# Patient Record
Sex: MALE | Race: WHITE | Employment: OTHER | URBAN - METROPOLITAN AREA
[De-identification: names, ages, dates, MRNs, and addresses within clinical notes are randomized per-mention and may not be internally consistent; named-entity substitution may affect disease eponyms.]

---

## 2017-12-15 ENCOUNTER — OFFICE VISIT (OUTPATIENT)
Dept: URGENT CARE | Facility: URGENT CARE | Age: 32
End: 2017-12-15
Payer: MEDICAID

## 2017-12-15 VITALS
OXYGEN SATURATION: 100 % | DIASTOLIC BLOOD PRESSURE: 94 MMHG | HEART RATE: 96 BPM | WEIGHT: 212.6 LBS | SYSTOLIC BLOOD PRESSURE: 160 MMHG | TEMPERATURE: 98.7 F | BODY MASS INDEX: 29.44 KG/M2

## 2017-12-15 DIAGNOSIS — R51.9 NONINTRACTABLE HEADACHE, UNSPECIFIED CHRONICITY PATTERN, UNSPECIFIED HEADACHE TYPE: ICD-10-CM

## 2017-12-15 DIAGNOSIS — H66.003 ACUTE SUPPURATIVE OTITIS MEDIA OF BOTH EARS WITHOUT SPONTANEOUS RUPTURE OF TYMPANIC MEMBRANES, RECURRENCE NOT SPECIFIED: Primary | ICD-10-CM

## 2017-12-15 DIAGNOSIS — Z72.0 TOBACCO ABUSE: ICD-10-CM

## 2017-12-15 DIAGNOSIS — R03.0 ELEVATED BLOOD PRESSURE READING WITHOUT DIAGNOSIS OF HYPERTENSION: ICD-10-CM

## 2017-12-15 PROCEDURE — 99214 OFFICE O/P EST MOD 30 MIN: CPT | Performed by: FAMILY MEDICINE

## 2017-12-15 NOTE — MR AVS SNAPSHOT
After Visit Summary   12/15/2017    Neftali Chan    MRN: [id number]           Patient Information     Date Of Birth          1985        Visit Information        Provider Department      12/15/2017 6:20 PM Gama Miranda MD [facility]        Today's Diagnoses     Acute suppurative otitis media of both ears without spontaneous rupture of tympanic membranes, recurrence not specified    -  1    Nonintractable headache, unspecified chronicity pattern, unspecified headache type        Tobacco abuse          Care Instructions      Otitis Media (Middle-Ear Infection) in Adults  Otitis media is another name for a middle-ear infection. It means an infection behind your eardrum. This kind of ear infection can happen after any condition that keeps fluid from draining from the middle ear. These conditions include allergies, a cold, a sore throat, or a respiratory infection.  Middle-ear infections are common in children, but they can also happen in adults. An ear infection in an adult may mean a more serious problem than in a child. So you may need additional tests. If you have an ear infection, you should see your health care provider for treatment.  What are the types of middle-ear infections?  Infections can affect the middle ear in several ways. They are:    Acute otitis media. This middle-ear infection occurs suddenly. It causes swelling and redness. Fluid and mucus become trapped inside the ear. You can have a fever and ear pain.    Otitis media with effusion. Fluid (effusion) and mucus build up in the middle ear after the infection goes away. You may feel like your middle ear is full. This can continue for months and may affect your hearing.    Chronic otitis media with effusion. Fluid (effusion) remains in the middle ear for a long time. Or it builds up again and again, even though there is no infection. This type of middle-ear infection may be hard to treat. It may also affect  your hearing.  Who is more likely to get a middle-ear infection?  You are more likely to get an ear infection if you:    Smoke or are around someone who smokes    Have seasonal or year-round allergy symptoms    Have a cold or other upper respiratory infection  What causes a middle-ear infection?  The middle ear connects to the throat by a canal called the eustachian tube. This tube helps even out the pressure between the outer ear and the inner ear. A cold or allergy can irritate the tube or cause the area around it to swell. This can keep fluid from draining from the middle ear. The fluid builds up behind the eardrum. Bacteria and viruses can grow in this fluid. The bacteria and viruses cause the middle-ear infection.  What are the symptoms of a middle-ear infection?  Common symptoms of a middle-ear infection in adults are:    Pain in 1 or both ears    Drainage from the ear    Muffled hearing    Sore throat   You may also have a fever. Rarely, your balance can be affected.  These symptoms may be the same as for other conditions. It s important to talk with your health care provider if you think you have a middle-ear infection. If you have a high fever, severe pain behind your ear, or paralysis in your face, see your provider as soon as you can.  How is a middle-ear infection diagnosed?  Your health care provider will take a medical history and do a physical exam. He or she will look at the outer ear and eardrum with an otoscope. The otoscope is a lighted tool that lets your provider see inside the ear. A pneumatic otoscope blows a puff of air into the ear to check how well your eardrum moves. If you eardrum doesn t move well, it may mean you have fluid behind it.  Your provider may also do a test called tympanometry. This test tells how well the middle ear is working. It can find any changes in pressure in the middle ear. Your provider may test your hearing with a tuning fork.  How is a middle-ear infection  treated?  A middle-ear infection may be treated with:    Antibiotics, taken by mouth or as ear drops    Medication for pain    Decongestants, antihistamines, or nasal steroids  Your health care provider may also have you try autoinsufflation. This helps adjust the air pressure in your ear. For this, you pinch your nose and gently exhale. This forces air back through the eustachian tube.  The exact treatment for your ear infection will depend on the type of infection you have. In general, if your symptoms don t get better in 48 to 72 hours, contact your health care provider.  Middle-ear infections can cause long-term problems if not treated. They can lead to:    Infection in other parts of the head    Permanent hearing loss    Paralysis of a nerve in your face  If you have a middle-ear infection that doesn t get better, you may need to see an ear, nose, and throat specialist (otolaryngologist). You may need a CT scan or MRI to check for head and neck cancer.  Ear tubes  Sometimes fluid stays in the middle ear even after you take antibiotics and the infection goes away. In this case, your health care provider may suggest that a small tube be placed in your ear. The tube is put at the opening of the eardrum. The tube keeps fluid from building up and relieves pressure in the middle ear. It can also help you hear better. This surgery is called myringotomy. It is not often done in adults.  The tubes usually fall out on their own after 6 months to a year.    6529-8750 [employer] [address] [place] [address]. All rights reserved. This information is not intended as a substitute for professional medical care. Always follow your healthcare professional's instructions.        Amoxicillin; Clavulanic Acid tablets  Brand Name: Augmentin  What is this medicine?  AMOXICILLIN; CLAVULANIC ACID (a mox i ABDIRIZAK in; SOLO foy ic AS id) is a penicillin antibiotic. It is used to treat certain kinds of  bacterial infections. It will not work for colds, flu, or other viral infections.  How should I use this medicine?  Take this medicine by mouth with a full glass of water. Follow the directions on the prescription label. Take at the start of a meal. Do not crush or chew. If the tablet has a score line, you may cut it in half at the score line for easier swallowing. Take your medicine at regular intervals. Do not take your medicine more often than directed. Take all of your medicine as directed even if you think you are better. Do not skip doses or stop your medicine early.  Talk to your pediatrician regarding the use of this medicine in children. Special care may be needed.  What side effects may I notice from receiving this medicine?  Side effects that you should report to your doctor or health care professional as soon as possible:    allergic reactions like skin rash, itching or hives, swelling of the face, lips, or tongue    breathing problems    dark urine    fever or chills, sore throat    redness, blistering, peeling or loosening of the skin, including inside the mouth    seizures    trouble passing urine or change in the amount of urine    unusual bleeding, bruising    unusually weak or tired    white patches or sores in the mouth or throat  Side effects that usually do not require medical attention (report to your doctor or health care professional if they continue or are bothersome):    diarrhea    dizziness    headache    nausea, vomiting    stomach upset    vaginal or anal irritation  What may interact with this medicine?    allopurinol    anticoagulants    birth control pills    methotrexate    probenecid  What if I miss a dose?  If you miss a dose, take it as soon as you can. If it is almost time for your next dose, take only that dose. Do not take double or extra doses.  Where should I keep my medicine?  Keep out of the reach of children.  Store at room temperature below 25 degrees C (77 degrees F).  Keep container tightly closed. Throw away any unused medicine after the expiration date.  What should I tell my health care provider before I take this medicine?  They need to know if you have any of these conditions:    bowel disease, like colitis    kidney disease    liver disease    mononucleosis    an unusual or allergic reaction to amoxicillin, penicillin, cephalosporin, other antibiotics, clavulanic acid, other medicines, foods, dyes, or preservatives    pregnant or trying to get pregnant    breast-feeding  What should I watch for while using this medicine?  Tell your doctor or health care professional if your symptoms do not improve.  Do not treat diarrhea with over the counter products. Contact your doctor if you have diarrhea that lasts more than 2 days or if it is severe and watery.  If you have diabetes, you may get a false-positive result for sugar in your urine. Check with your doctor or health care professional.  Birth control pills may not work properly while you are taking this medicine. Talk to your doctor about using an extra method of birth control.  NOTE:This sheet is a summary. It may not cover all possible information. If you have questions about this medicine, talk to your doctor, pharmacist, or health care provider. Copyright  2017 [employer]        Self-Care for Headaches  Most headaches aren't serious and can be relieved with self-care. But some headaches may be a sign of another health problem like eye trouble or high blood pressure. To find the best treatment, learn what kind of headaches you get. For tension headaches, self-care will usually help. To treat migraines, ask your healthcare provider for advice. It is also possible to get both tension and migraine headaches. Self-care involves relieving the pain and avoiding headache  triggers  if you can.    Ways to reduce pain and tension  Try these steps:    Apply a cold compress or ice pack to the pain site.    Drink fluids. If nausea makes  "it hard to drink, try sucking on ice.    Rest. Protect yourself from bright light and loud noises.    Calm your emotions by imagining a peaceful scene.    Massage tight neck, shoulder, and head muscles.    To relax muscles, soak in a hot bath or use a hot shower.  Use medicines  Aspirin or aspirin substitutes, such as ibuprofen and acetaminophen, can relieve headache. Remember: Never give aspirin to anyone 18 years old or younger because of the risk of developing Reye syndrome. Use pain medicines only when necessary.  Track your headaches  Keeping a headache diary can help you and your healthcare provider identify what's causing your headaches:    Note when each headache happens.    Identify your activities and the foods you've eaten 6 to 8 hours before the headache began.    Look for any trends or \"triggers.\"  Signs of tension headache  Any of the following can be signs:    Dull pain or feeling of pressure in a tight band around your head    Pain in your neck or shoulders    Headache without a definite beginning or end    Headache after an activity such as driving or working on a computer  Signs of migraine  Any of the following can be signs:    Throbbing pain on one or both sides of your head    Nausea or vomiting    Extreme sensitivity to light, sound, and smells    Bright spots, flashes, or other visual changes    Pain or nausea so severe that you can't continue your daily activities  Call your healthcare provider   If you have any of the following symptoms, contact your healthcare provider:    A headache that lingers after a recent injury or bump to the head.    A fever with a stiff neck or pain when you bend your head toward your chest.    A headache along with slurred speech, changes in your vision, or numbness or weakness in your arms or legs.    A headache for longer than 3 days.    Frequent headaches, especially in the morning.    Headaches with seizures     Seek immediate medical attention if you have a " "headache that you would call \"the worst headache you have ever had.\"   Date Last Reviewed: 10/4/2015    4700-5815 [employer] [facility] [employer]. [address] [place] [address], [place] [address]. All rights reserved. This information is not intended as a substitute for professional medical care. Always follow your healthcare professional's instructions.                Follow-ups after your visit        Who to contact     If you have questions or need follow up information about today's clinic visit or your schedule please contact [facility] [place] directly at [phone].  Normal or non-critical lab and imaging results will be communicated to you by CloudEndurehart, letter or phone within 4 business days after the clinic has received the results. If you do not hear from us within 7 days, please contact the clinic through CloudEndurehart or phone. If you have a critical or abnormal lab result, we will notify you by phone as soon as possible.  Submit refill requests through [employer] or call your pharmacy and they will forward the refill request to us. Please allow 3 business days for your refill to be completed.          Additional Information About Your Visit        MyChart Information     [employer] lets you send messages to your doctor, view your test results, renew your prescriptions, schedule appointments and more. To sign up, go to [email or web address] . Click on \"Log in\" on the left side of the screen, which will take you to the Welcome page. Then click on \"Sign up Now\" on the right side of the page.     You will be asked to enter the access code listed below, as well as some personal information. Please follow the directions to create your username and password.     Your access code is: [email or web address]  Expires: 3/15/2018  6:43 PM     Your access code will  in 90 days. If you need help or a new code, please call your [facility] or [phone].        Care EveryWhere ID     This is your Care EveryWhere ID. This could be " used by other organizations to access your [facility] medical records  [id number]        Your Vitals Were     Pulse Temperature Pulse Oximetry BMI (Body Mass Index)          96 98.7  F (37.1  C) (Tympanic) 100% 29.44 kg/m2         Blood Pressure from Last 3 Encounters:   12/15/17 (!) 160/94   11/20/15 136/74   11/17/15 140/81    Weight from Last 3 Encounters:   12/15/17 212 lb 9.6 oz (96.4 kg)   11/20/15 204 lb (92.5 kg)   11/17/15 202 lb 6.4 oz (91.8 kg)              Today, you had the following     No orders found for display         Today's Medication Changes          These changes are accurate as of: 12/15/17  6:44 PM.  If you have any questions, ask your nurse or doctor.               Start taking these medicines.        Dose/Directions    amoxicillin-clavulanate 875-125 MG per tablet   Commonly known as:  AUGMENTIN   Used for:  Acute suppurative otitis media of both ears without spontaneous rupture of tympanic membranes, recurrence not specified        Dose:  1 tablet   Take 1 tablet by mouth 2 times daily   Quantity:  20 tablet   Refills:  0            Where to get your medicines      These medications were sent to [facility] [address] [place] [address] [address] [facility] [address] [facility] [place]  [address] [facility] [address]     Phone:  [phone]     amoxicillin-clavulanate 875-125 MG per tablet                Primary Care Provider Office Phone # Fax #    [facility] [phone] [address] [facility] [address]        Equal Access to Services     JESUS MENCHACA AH: Hadii aad ku hadasho Soharriet, waaxda luqadaha, qaybta kaalmada adecaleb, tanisha etienne. So [facility] [phone].    ATENCIÓN: Si habla español, tiene a dwyer disposición servicios gratuitos de asistencia lingüística. Llame al [phone].    We comply with applicable federal civil rights laws and [place] laws. We do not discriminate on the basis of  race, color, national origin, age, disability, sex, sexual orientation, or gender identity.            Thank you!     Thank you for choosing [facility]  [facility]. Our goal is always to provide you with excellent care. Hearing back from our patients is one way we can continue to improve our services. Please take a few minutes to complete the written survey that you may receive in the mail after your visit with us. Thank you!             Your Updated Medication List - Protect others around you: Learn how to safely use, store and throw away your medicines at www.disposemymeds.org.          This list is accurate as of: 12/15/17  6:44 PM.  Always use your most recent med list.                   Brand Name Dispense Instructions for use Diagnosis    acetaminophen-codeine 300-30 MG per tablet    TYLENOL w/CODEINE No. 3    20 tablet    Take 1-2 tablets by mouth nightly as needed for mild pain    Suppurative otitis media of left ear, unspecified chronicity       amoxicillin-clavulanate 875-125 MG per tablet    AUGMENTIN    20 tablet    Take 1 tablet by mouth 2 times daily    Acute suppurative otitis media of both ears without spontaneous rupture of tympanic membranes, recurrence not specified       cefuroxime 500 MG tablet    CEFTIN    20 tablet    Take 1 tablet (500 mg) by mouth 2 times daily    Suppurative otitis media of left ear, unspecified chronicity       cyclobenzaprine 10 MG tablet    FLEXERIL    14 tablet    Take 1 tablet (10 mg) by mouth nightly as needed for muscle spasms    Cervicalgia       guaiFENesin-codeine 100-10 MG/5ML Soln solution    ROBITUSSIN AC    120 mL    Take 5-10 mLs by mouth At Bedtime    Upper respiratory tract infection, unspecified upper respiratory infection

## 2017-12-16 NOTE — PATIENT INSTRUCTIONS
Otitis Media (Middle-Ear Infection) in Adults  Otitis media is another name for a middle-ear infection. It means an infection behind your eardrum. This kind of ear infection can happen after any condition that keeps fluid from draining from the middle ear. These conditions include allergies, a cold, a sore throat, or a respiratory infection.  Middle-ear infections are common in children, but they can also happen in adults. An ear infection in an adult may mean a more serious problem than in a child. So you may need additional tests. If you have an ear infection, you should see your health care provider for treatment.  What are the types of middle-ear infections?  Infections can affect the middle ear in several ways. They are:    Acute otitis media. This middle-ear infection occurs suddenly. It causes swelling and redness. Fluid and mucus become trapped inside the ear. You can have a fever and ear pain.    Otitis media with effusion. Fluid (effusion) and mucus build up in the middle ear after the infection goes away. You may feel like your middle ear is full. This can continue for months and may affect your hearing.    Chronic otitis media with effusion. Fluid (effusion) remains in the middle ear for a long time. Or it builds up again and again, even though there is no infection. This type of middle-ear infection may be hard to treat. It may also affect your hearing.  Who is more likely to get a middle-ear infection?  You are more likely to get an ear infection if you:    Smoke or are around someone who smokes    Have seasonal or year-round allergy symptoms    Have a cold or other upper respiratory infection  What causes a middle-ear infection?  The middle ear connects to the throat by a canal called the eustachian tube. This tube helps even out the pressure between the outer ear and the inner ear. A cold or allergy can irritate the tube or cause the area around it to swell. This can keep fluid from draining from  the middle ear. The fluid builds up behind the eardrum. Bacteria and viruses can grow in this fluid. The bacteria and viruses cause the middle-ear infection.  What are the symptoms of a middle-ear infection?  Common symptoms of a middle-ear infection in adults are:    Pain in 1 or both ears    Drainage from the ear    Muffled hearing    Sore throat   You may also have a fever. Rarely, your balance can be affected.  These symptoms may be the same as for other conditions. It s important to talk with your health care provider if you think you have a middle-ear infection. If you have a high fever, severe pain behind your ear, or paralysis in your face, see your provider as soon as you can.  How is a middle-ear infection diagnosed?  Your health care provider will take a medical history and do a physical exam. He or she will look at the outer ear and eardrum with an otoscope. The otoscope is a lighted tool that lets your provider see inside the ear. A pneumatic otoscope blows a puff of air into the ear to check how well your eardrum moves. If you eardrum doesn t move well, it may mean you have fluid behind it.  Your provider may also do a test called tympanometry. This test tells how well the middle ear is working. It can find any changes in pressure in the middle ear. Your provider may test your hearing with a tuning fork.  How is a middle-ear infection treated?  A middle-ear infection may be treated with:    Antibiotics, taken by mouth or as ear drops    Medication for pain    Decongestants, antihistamines, or nasal steroids  Your health care provider may also have you try autoinsufflation. This helps adjust the air pressure in your ear. For this, you pinch your nose and gently exhale. This forces air back through the eustachian tube.  The exact treatment for your ear infection will depend on the type of infection you have. In general, if your symptoms don t get better in 48 to 72 hours, contact your health care  provider.  Middle-ear infections can cause long-term problems if not treated. They can lead to:    Infection in other parts of the head    Permanent hearing loss    Paralysis of a nerve in your face  If you have a middle-ear infection that doesn t get better, you may need to see an ear, nose, and throat specialist (otolaryngologist). You may need a CT scan or MRI to check for head and neck cancer.  Ear tubes  Sometimes fluid stays in the middle ear even after you take antibiotics and the infection goes away. In this case, your health care provider may suggest that a small tube be placed in your ear. The tube is put at the opening of the eardrum. The tube keeps fluid from building up and relieves pressure in the middle ear. It can also help you hear better. This surgery is called myringotomy. It is not often done in adults.  The tubes usually fall out on their own after 6 months to a year.    2407-0166 [employer]. [address] [place] [address]. All rights reserved. This information is not intended as a substitute for professional medical care. Always follow your healthcare professional's instructions.        Amoxicillin; Clavulanic Acid tablets  Brand Name: Augmentin  What is this medicine?  AMOXICILLIN; CLAVULANIC ACID (a mox i ABDIRIZAK in; SOLO foy ic AS id) is a penicillin antibiotic. It is used to treat certain kinds of bacterial infections. It will not work for colds, flu, or other viral infections.  How should I use this medicine?  Take this medicine by mouth with a full glass of water. Follow the directions on the prescription label. Take at the start of a meal. Do not crush or chew. If the tablet has a score line, you may cut it in half at the score line for easier swallowing. Take your medicine at regular intervals. Do not take your medicine more often than directed. Take all of your medicine as directed even if you think you are better. Do not skip doses or stop your medicine  early.  Talk to your pediatrician regarding the use of this medicine in children. Special care may be needed.  What side effects may I notice from receiving this medicine?  Side effects that you should report to your doctor or health care professional as soon as possible:    allergic reactions like skin rash, itching or hives, swelling of the face, lips, or tongue    breathing problems    dark urine    fever or chills, sore throat    redness, blistering, peeling or loosening of the skin, including inside the mouth    seizures    trouble passing urine or change in the amount of urine    unusual bleeding, bruising    unusually weak or tired    white patches or sores in the mouth or throat  Side effects that usually do not require medical attention (report to your doctor or health care professional if they continue or are bothersome):    diarrhea    dizziness    headache    nausea, vomiting    stomach upset    vaginal or anal irritation  What may interact with this medicine?    allopurinol    anticoagulants    birth control pills    methotrexate    probenecid  What if I miss a dose?  If you miss a dose, take it as soon as you can. If it is almost time for your next dose, take only that dose. Do not take double or extra doses.  Where should I keep my medicine?  Keep out of the reach of children.  Store at room temperature below 25 degrees C (77 degrees F). Keep container tightly closed. Throw away any unused medicine after the expiration date.  What should I tell my health care provider before I take this medicine?  They need to know if you have any of these conditions:    bowel disease, like colitis    kidney disease    liver disease    mononucleosis    an unusual or allergic reaction to amoxicillin, penicillin, cephalosporin, other antibiotics, clavulanic acid, other medicines, foods, dyes, or preservatives    pregnant or trying to get pregnant    breast-feeding  What should I watch for while using this  medicine?  Tell your doctor or health care professional if your symptoms do not improve.  Do not treat diarrhea with over the counter products. Contact your doctor if you have diarrhea that lasts more than 2 days or if it is severe and watery.  If you have diabetes, you may get a false-positive result for sugar in your urine. Check with your doctor or health care professional.  Birth control pills may not work properly while you are taking this medicine. Talk to your doctor about using an extra method of birth control.  NOTE:This sheet is a summary. It may not cover all possible information. If you have questions about this medicine, talk to your doctor, pharmacist, or health care provider. Copyright  2017 [employer]        Self-Care for Headaches  Most headaches aren't serious and can be relieved with self-care. But some headaches may be a sign of another health problem like eye trouble or high blood pressure. To find the best treatment, learn what kind of headaches you get. For tension headaches, self-care will usually help. To treat migraines, ask your healthcare provider for advice. It is also possible to get both tension and migraine headaches. Self-care involves relieving the pain and avoiding headache  triggers  if you can.    Ways to reduce pain and tension  Try these steps:    Apply a cold compress or ice pack to the pain site.    Drink fluids. If nausea makes it hard to drink, try sucking on ice.    Rest. Protect yourself from bright light and loud noises.    Calm your emotions by imagining a peaceful scene.    Massage tight neck, shoulder, and head muscles.    To relax muscles, soak in a hot bath or use a hot shower.  Use medicines  Aspirin or aspirin substitutes, such as ibuprofen and acetaminophen, can relieve headache. Remember: Never give aspirin to anyone 18 years old or younger because of the risk of developing Reye syndrome. Use pain medicines only when necessary.  Track your headaches  Keeping a  "headache diary can help you and your healthcare provider identify what's causing your headaches:    Note when each headache happens.    Identify your activities and the foods you've eaten 6 to 8 hours before the headache began.    Look for any trends or \"triggers.\"  Signs of tension headache  Any of the following can be signs:    Dull pain or feeling of pressure in a tight band around your head    Pain in your neck or shoulders    Headache without a definite beginning or end    Headache after an activity such as driving or working on a computer  Signs of migraine  Any of the following can be signs:    Throbbing pain on one or both sides of your head    Nausea or vomiting    Extreme sensitivity to light, sound, and smells    Bright spots, flashes, or other visual changes    Pain or nausea so severe that you can't continue your daily activities  Call your healthcare provider   If you have any of the following symptoms, contact your healthcare provider:    A headache that lingers after a recent injury or bump to the head.    A fever with a stiff neck or pain when you bend your head toward your chest.    A headache along with slurred speech, changes in your vision, or numbness or weakness in your arms or legs.    A headache for longer than 3 days.    Frequent headaches, especially in the morning.    Headaches with seizures     Seek immediate medical attention if you have a headache that you would call \"the worst headache you have ever had.\"   Date Last Reviewed: 10/4/2015    7843-1659 [employer]. [address] [place] [address]. All rights reserved. This information is not intended as a substitute for professional medical care. Always follow your healthcare professional's instructions.        "

## 2017-12-16 NOTE — PROGRESS NOTES
SUBJECTIVE:                                                    Neftali Chan is a 32 year old male who presents to clinic today for the following health issues:    Headaches      Duration: 3 weeks    Description  Location: all over his head   Character: sharp pain, squeezing pain  Frequency:  All day  Duration:  Every day    Intensity:  Severe, 10/10    Accompanying signs and symptoms:    Precipitating or Alleviating factors:  Nausea/vomiting: no  Dizziness: always  Weakness or numbness: always- arthritis   Visual changes: blurred vision when he had the stabbing pain  Fever: no   Sinus or URI symptoms YES- ear pain, eye pain, and chest pain    History  Head trauma: no   Family history of migraines: no   Previous tests for headaches: no   Neurologist evaluations: no   Able to do daily activities when headache present: no   Wake with headaches: YES  Daily pain medication use: YES  Any changes in: none    Precipitating or Alleviating factors (light/sound/sleep/caffeine): none    Therapies tried and outcome: Ibuprofen (Advil, Motrin) and Tylenol    Outcome - not effective  Frequent/daily pain medication use: no     Patient was seen at [place] ED last night for the same issue.  Patient is not feeling better        Problem list and histories reviewed & adjusted, as indicated.  Additional history: as documented    Patient Active Problem List   Diagnosis     Bipolar 2 disorder (H)     Anxiety     Tobacco abuse     No past surgical history on file.    Social History   Substance Use Topics     Smoking status: Current Every Day Smoker     Packs/day: 0.75     Types: Cigarettes     Smokeless tobacco: Former User     Alcohol use No     Family History   Problem Relation Age of Onset     Asthma Mother      Asthma Father      Asthma Sister      C.A.D. Father      DIABETES No family hx of      Hypertension Father      CEREBROVASCULAR DISEASE Father      Breast Cancer No family hx of      Cancer - colorectal No family hx  of      Prostate Cancer No family hx of          Current Outpatient Prescriptions   Medication Sig Dispense Refill     amoxicillin-clavulanate (AUGMENTIN) 875-125 MG per tablet Take 1 tablet by mouth 2 times daily 20 tablet 0     cefUROXime (CEFTIN) 500 MG tablet Take 1 tablet (500 mg) by mouth 2 times daily 20 tablet 0     acetaminophen-codeine (TYLENOL W/CODEINE NO. 3) 300-30 MG per tablet Take 1-2 tablets by mouth nightly as needed for mild pain 20 tablet 0     guaiFENesin-codeine (ROBITUSSIN AC) 100-10 MG/5ML SOLN Take 5-10 mLs by mouth At Bedtime 120 mL 0     cyclobenzaprine (FLEXERIL) 10 MG tablet Take 1 tablet (10 mg) by mouth nightly as needed for muscle spasms 14 tablet 0     No Known Allergies  No lab results found.   BP Readings from Last 3 Encounters:   12/15/17 (!) 160/94   11/20/15 136/74   11/17/15 140/81    Wt Readings from Last 3 Encounters:   12/15/17 212 lb 9.6 oz (96.4 kg)   11/20/15 204 lb (92.5 kg)   11/17/15 202 lb 6.4 oz (91.8 kg)                  Labs reviewed in EPIC          ROS:  Constitutional, HEENT, cardiovascular, pulmonary, GI, , musculoskeletal, neuro, skin, endocrine and psych systems are negative, except as otherwise noted.      OBJECTIVE:   BP (!) 160/94  Pulse 96  Temp 98.7  F (37.1  C) (Tympanic)  Wt 212 lb 9.6 oz (96.4 kg)  SpO2 100%  BMI 29.44 kg/m2  Body mass index is 29.44 kg/(m^2).  GENERAL: alert and in some distress due to pain  EYES: Eyes grossly normal to inspection, PERRL and conjunctivae and sclerae normal  HENT: normal cephalic/atraumatic, right ear: erythematous, bulging membrane and mucopurulent effusion, left ear: erythematous, bulging membrane and mucopurulent effusion, oral mucous membranes moist and oropharxnx crowded  NECK: no adenopathy, no asymmetry, masses, or scars and thyroid normal to palpation  RESP: lungs clear to auscultation - no rales, rhonchi or wheezes  CV: regular rate and rhythm, normal S1 S2, no S3 or S4, no murmur, click or rub, no  peripheral edema and peripheral pulses strong  ABDOMEN: soft, nontender, no hepatosplenomegaly, no masses and bowel sounds normal  MS: no gross musculoskeletal defects noted, no edema  SKIN: no suspicious lesions or rashes  NEURO: Normal strength and tone, sensory exam grossly normal, mentation intact, speech normal and cranial nerves 2-12 intact  LYMPH: no cervical, supraclavicular, axillary, or inguinal adenopathy      ASSESSMENT/PLAN:         ICD-10-CM    1. Acute suppurative otitis media of both ears without spontaneous rupture of tympanic membranes, recurrence not specified H66.003 amoxicillin-clavulanate (AUGMENTIN) 875-125 MG per tablet   2. Nonintractable headache, unspecified chronicity pattern, unspecified headache type R51    3. Tobacco abuse Z72.0    4. Elevated blood pressure reading without diagnosis of hypertension R03.0        Patient had extensive work up yesterday in ER for chest pain and headache. No formal diagnosis was made. Physical exam today remarkable for severe bilateral ear infection. Blood pressure noted to be elevated, likely related to pain. No focal neurology identified. Augmentin prescribed, common side effects discussed. Recommended well hydration, over-the-counter analgesia and rest.  Smoking cessation counseling provided, not ready to quit currently. Written instructions/information provided. Patient understood and in agreement with the above plan. All questions are answered. Follow-up with PCP next week or go ER if symptoms worsen over the weekend.       Patient Instructions       Otitis Media (Middle-Ear Infection) in Adults  Otitis media is another name for a middle-ear infection. It means an infection behind your eardrum. This kind of ear infection can happen after any condition that keeps fluid from draining from the middle ear. These conditions include allergies, a cold, a sore throat, or a respiratory infection.  Middle-ear infections are common in children, but they can  also happen in adults. An ear infection in an adult may mean a more serious problem than in a child. So you may need additional tests. If you have an ear infection, you should see your health care provider for treatment.  What are the types of middle-ear infections?  Infections can affect the middle ear in several ways. They are:    Acute otitis media. This middle-ear infection occurs suddenly. It causes swelling and redness. Fluid and mucus become trapped inside the ear. You can have a fever and ear pain.    Otitis media with effusion. Fluid (effusion) and mucus build up in the middle ear after the infection goes away. You may feel like your middle ear is full. This can continue for months and may affect your hearing.    Chronic otitis media with effusion. Fluid (effusion) remains in the middle ear for a long time. Or it builds up again and again, even though there is no infection. This type of middle-ear infection may be hard to treat. It may also affect your hearing.  Who is more likely to get a middle-ear infection?  You are more likely to get an ear infection if you:    Smoke or are around someone who smokes    Have seasonal or year-round allergy symptoms    Have a cold or other upper respiratory infection  What causes a middle-ear infection?  The middle ear connects to the throat by a canal called the eustachian tube. This tube helps even out the pressure between the outer ear and the inner ear. A cold or allergy can irritate the tube or cause the area around it to swell. This can keep fluid from draining from the middle ear. The fluid builds up behind the eardrum. Bacteria and viruses can grow in this fluid. The bacteria and viruses cause the middle-ear infection.  What are the symptoms of a middle-ear infection?  Common symptoms of a middle-ear infection in adults are:    Pain in 1 or both ears    Drainage from the ear    Muffled hearing    Sore throat   You may also have a fever. Rarely, your balance can be  affected.  These symptoms may be the same as for other conditions. It s important to talk with your health care provider if you think you have a middle-ear infection. If you have a high fever, severe pain behind your ear, or paralysis in your face, see your provider as soon as you can.  How is a middle-ear infection diagnosed?  Your health care provider will take a medical history and do a physical exam. He or she will look at the outer ear and eardrum with an otoscope. The otoscope is a lighted tool that lets your provider see inside the ear. A pneumatic otoscope blows a puff of air into the ear to check how well your eardrum moves. If you eardrum doesn t move well, it may mean you have fluid behind it.  Your provider may also do a test called tympanometry. This test tells how well the middle ear is working. It can find any changes in pressure in the middle ear. Your provider may test your hearing with a tuning fork.  How is a middle-ear infection treated?  A middle-ear infection may be treated with:    Antibiotics, taken by mouth or as ear drops    Medication for pain    Decongestants, antihistamines, or nasal steroids  Your health care provider may also have you try autoinsufflation. This helps adjust the air pressure in your ear. For this, you pinch your nose and gently exhale. This forces air back through the eustachian tube.  The exact treatment for your ear infection will depend on the type of infection you have. In general, if your symptoms don t get better in 48 to 72 hours, contact your health care provider.  Middle-ear infections can cause long-term problems if not treated. They can lead to:    Infection in other parts of the head    Permanent hearing loss    Paralysis of a nerve in your face  If you have a middle-ear infection that doesn t get better, you may need to see an ear, nose, and throat specialist (otolaryngologist). You may need a CT scan or MRI to check for head and neck cancer.  Ear  tubes  Sometimes fluid stays in the middle ear even after you take antibiotics and the infection goes away. In this case, your health care provider may suggest that a small tube be placed in your ear. The tube is put at the opening of the eardrum. The tube keeps fluid from building up and relieves pressure in the middle ear. It can also help you hear better. This surgery is called myringotomy. It is not often done in adults.  The tubes usually fall out on their own after 6 months to a year.    6979-6954 [employer]. [address] [place] [address]. All rights reserved. This information is not intended as a substitute for professional medical care. Always follow your healthcare professional's instructions.        Amoxicillin; Clavulanic Acid tablets  Brand Name: Augmentin  What is this medicine?  AMOXICILLIN; CLAVULANIC ACID (a mox i ABDIRIZAK in; SOLO foy ic AS id) is a penicillin antibiotic. It is used to treat certain kinds of bacterial infections. It will not work for colds, flu, or other viral infections.  How should I use this medicine?  Take this medicine by mouth with a full glass of water. Follow the directions on the prescription label. Take at the start of a meal. Do not crush or chew. If the tablet has a score line, you may cut it in half at the score line for easier swallowing. Take your medicine at regular intervals. Do not take your medicine more often than directed. Take all of your medicine as directed even if you think you are better. Do not skip doses or stop your medicine early.  Talk to your pediatrician regarding the use of this medicine in children. Special care may be needed.  What side effects may I notice from receiving this medicine?  Side effects that you should report to your doctor or health care professional as soon as possible:    allergic reactions like skin rash, itching or hives, swelling of the face, lips, or tongue    breathing problems    dark urine    fever  or chills, sore throat    redness, blistering, peeling or loosening of the skin, including inside the mouth    seizures    trouble passing urine or change in the amount of urine    unusual bleeding, bruising    unusually weak or tired    white patches or sores in the mouth or throat  Side effects that usually do not require medical attention (report to your doctor or health care professional if they continue or are bothersome):    diarrhea    dizziness    headache    nausea, vomiting    stomach upset    vaginal or anal irritation  What may interact with this medicine?    allopurinol    anticoagulants    birth control pills    methotrexate    probenecid  What if I miss a dose?  If you miss a dose, take it as soon as you can. If it is almost time for your next dose, take only that dose. Do not take double or extra doses.  Where should I keep my medicine?  Keep out of the reach of children.  Store at room temperature below 25 degrees C (77 degrees F). Keep container tightly closed. Throw away any unused medicine after the expiration date.  What should I tell my health care provider before I take this medicine?  They need to know if you have any of these conditions:    bowel disease, like colitis    kidney disease    liver disease    mononucleosis    an unusual or allergic reaction to amoxicillin, penicillin, cephalosporin, other antibiotics, clavulanic acid, other medicines, foods, dyes, or preservatives    pregnant or trying to get pregnant    breast-feeding  What should I watch for while using this medicine?  Tell your doctor or health care professional if your symptoms do not improve.  Do not treat diarrhea with over the counter products. Contact your doctor if you have diarrhea that lasts more than 2 days or if it is severe and watery.  If you have diabetes, you may get a false-positive result for sugar in your urine. Check with your doctor or health care professional.  Birth control pills may not work properly  "while you are taking this medicine. Talk to your doctor about using an extra method of birth control.  NOTE:This sheet is a summary. It may not cover all possible information. If you have questions about this medicine, talk to your doctor, pharmacist, or health care provider. Copyright  2017 [employer]        Self-Care for Headaches  Most headaches aren't serious and can be relieved with self-care. But some headaches may be a sign of another health problem like eye trouble or high blood pressure. To find the best treatment, learn what kind of headaches you get. For tension headaches, self-care will usually help. To treat migraines, ask your healthcare provider for advice. It is also possible to get both tension and migraine headaches. Self-care involves relieving the pain and avoiding headache  triggers  if you can.    Ways to reduce pain and tension  Try these steps:    Apply a cold compress or ice pack to the pain site.    Drink fluids. If nausea makes it hard to drink, try sucking on ice.    Rest. Protect yourself from bright light and loud noises.    Calm your emotions by imagining a peaceful scene.    Massage tight neck, shoulder, and head muscles.    To relax muscles, soak in a hot bath or use a hot shower.  Use medicines  Aspirin or aspirin substitutes, such as ibuprofen and acetaminophen, can relieve headache. Remember: Never give aspirin to anyone 18 years old or younger because of the risk of developing Reye syndrome. Use pain medicines only when necessary.  Track your headaches  Keeping a headache diary can help you and your healthcare provider identify what's causing your headaches:    Note when each headache happens.    Identify your activities and the foods you've eaten 6 to 8 hours before the headache began.    Look for any trends or \"triggers.\"  Signs of tension headache  Any of the following can be signs:    Dull pain or feeling of pressure in a tight band around your head    Pain in your neck or " "shoulders    Headache without a definite beginning or end    Headache after an activity such as driving or working on a computer  Signs of migraine  Any of the following can be signs:    Throbbing pain on one or both sides of your head    Nausea or vomiting    Extreme sensitivity to light, sound, and smells    Bright spots, flashes, or other visual changes    Pain or nausea so severe that you can't continue your daily activities  Call your healthcare provider   If you have any of the following symptoms, contact your healthcare provider:    A headache that lingers after a recent injury or bump to the head.    A fever with a stiff neck or pain when you bend your head toward your chest.    A headache along with slurred speech, changes in your vision, or numbness or weakness in your arms or legs.    A headache for longer than 3 days.    Frequent headaches, especially in the morning.    Headaches with seizures     Seek immediate medical attention if you have a headache that you would call \"the worst headache you have ever had.\"   Date Last Reviewed: 10/4/2015    3893-9173 [employer]. [address] [place] [address]. All rights reserved. This information is not intended as a substitute for professional medical care. Always follow your healthcare professional's instructions.            Gama Miranda MD  [facility]  "

## 2017-12-18 ENCOUNTER — TELEPHONE (OUTPATIENT)
Dept: URGENT CARE | Facility: URGENT CARE | Age: 32
End: 2017-12-18

## 2017-12-18 NOTE — TELEPHONE ENCOUNTER
Patient was seen on Friday in urgent care with Dr. Miranda for upper respiratory infection/ear infection and was prescribed amoxicillin- patient says doctor told patient that he should be feeling better in 24-48 hrs-patient is not getting any better he reports. He is wondering if he needs to be seen again, or can he get a different medication. Please call to discuss and advise. Thank you

## 2017-12-18 NOTE — TELEPHONE ENCOUNTER
Patient was seen 12/15/17 for URI and bilateral ear infection was prescribe Augmentin.  Patient c/o right ear pain, ringing, which is causing migraines.  Patient states left ear pain has resolved.  Discussed may take more time for infection of right ear to resolve however if ear pain is unmanagable would advise evaluation to treat for pain management.  The patient/parent agrees with the plan and verbalized good understanding.  Anum Powell RN

## 2017-12-19 ENCOUNTER — OFFICE VISIT (OUTPATIENT)
Dept: FAMILY MEDICINE | Facility: CLINIC | Age: 32
End: 2017-12-19
Payer: MEDICAID

## 2017-12-19 VITALS
TEMPERATURE: 98.3 F | DIASTOLIC BLOOD PRESSURE: 95 MMHG | RESPIRATION RATE: 15 BRPM | WEIGHT: 208 LBS | SYSTOLIC BLOOD PRESSURE: 141 MMHG | BODY MASS INDEX: 28.8 KG/M2 | OXYGEN SATURATION: 98 % | HEART RATE: 86 BPM

## 2017-12-19 DIAGNOSIS — R06.02 SHORTNESS OF BREATH: ICD-10-CM

## 2017-12-19 DIAGNOSIS — R51.9 NONINTRACTABLE HEADACHE, UNSPECIFIED CHRONICITY PATTERN, UNSPECIFIED HEADACHE TYPE: Primary | ICD-10-CM

## 2017-12-19 DIAGNOSIS — R68.89 LIGHT SENSITIVITY: ICD-10-CM

## 2017-12-19 DIAGNOSIS — S09.90XA CLOSED HEAD INJURY, INITIAL ENCOUNTER: ICD-10-CM

## 2017-12-19 DIAGNOSIS — R03.0 ELEVATED BLOOD PRESSURE READING WITHOUT DIAGNOSIS OF HYPERTENSION: ICD-10-CM

## 2017-12-19 PROCEDURE — 99215 OFFICE O/P EST HI 40 MIN: CPT | Performed by: INTERNAL MEDICINE

## 2017-12-19 NOTE — MR AVS SNAPSHOT
"              After Visit Summary   2017    Neftali Chan    MRN: [id number]           Patient Information     Date Of Birth          1985        Visit Information        Provider Department      2017 2:00 PM Megan Randhawa MD [facility]        Care Instructions    Go immediately and directly to the ER.            Follow-ups after your visit        Who to contact     If you have questions or need follow up information about today's clinic visit or your schedule please contact [facility] directly at [phone].  Normal or non-critical lab and imaging results will be communicated to you by MyChart, letter or phone within 4 business days after the clinic has received the results. If you do not hear from us within 7 days, please contact the clinic through [email or web address] or phone. If you have a critical or abnormal lab result, we will notify you by phone as soon as possible.  Submit refill requests through [employer] or call your pharmacy and they will forward the refill request to us. Please allow 3 business days for your refill to be completed.          Additional Information About Your Visit        MyChart Information     [employer] lets you send messages to your doctor, view your test results, renew your prescriptions, schedule appointments and more. To sign up, go to [email or web address] . Click on \"Log in\" on the left side of the screen, which will take you to the Welcome page. Then click on \"Sign up Now\" on the right side of the page.     You will be asked to enter the access code listed below, as well as some personal information. Please follow the directions to create your username and password.     Your access code is: [email or web address]  Expires: 3/15/2018  6:43 PM     Your access code will  in 90 days. If you need help or a new code, please call your [facility] or [phone].        Care EveryWhere ID     This is your Care EveryWhere ID. This could be " used by other organizations to access your [facility] medical records  [id number]        Your Vitals Were     Pulse Temperature Respirations Pulse Oximetry BMI (Body Mass Index)       86 98.3  F (36.8  C) (Oral) 15 98% 28.8 kg/m2        Blood Pressure from Last 3 Encounters:   12/19/17 (!) 141/95   12/15/17 (!) 160/94   11/20/15 136/74    Weight from Last 3 Encounters:   12/19/17 208 lb (94.3 kg)   12/15/17 212 lb 9.6 oz (96.4 kg)   11/20/15 204 lb (92.5 kg)              Today, you had the following     No orders found for display       Primary Care Provider Office Phone # Fax #    [facility] [phone] [address] [facility] [address]        Equal Access to Services     AMTT MENCHACA : Hadii aad ku hadasho Soharriet, waaxda luqadaha, qaybta kaalmada adejoanyada, tanisha hsu . So [facility] [phone].    ATENCIÓN: Si habla español, tiene a dwyer disposición servicios gratuitos de asistencia lingüística. Llame al [phone].    We comply with applicable federal civil rights laws and [place] laws. We do not discriminate on the basis of race, color, national origin, age, disability, sex, sexual orientation, or gender identity.            Thank you!     Thank you for choosing [facility]  for your care. Our goal is always to provide you with excellent care. Hearing back from our patients is one way we can continue to improve our services. Please take a few minutes to complete the written survey that you may receive in the mail after your visit with us. Thank you!             Your Updated Medication List - Protect others around you: Learn how to safely use, store and throw away your medicines at www.disposemymeds.org.          This list is accurate as of: 12/19/17  2:32 PM.  Always use your most recent med list.                   Brand Name Dispense Instructions for use Diagnosis    acetaminophen-codeine 300-30 MG per tablet    TYLENOL w/CODEINE  No. 3    20 tablet    Take 1-2 tablets by mouth nightly as needed for mild pain    Suppurative otitis media of left ear, unspecified chronicity       amoxicillin-clavulanate 875-125 MG per tablet    AUGMENTIN    20 tablet    Take 1 tablet by mouth 2 times daily    Acute suppurative otitis media of both ears without spontaneous rupture of tympanic membranes, recurrence not specified       cyclobenzaprine 10 MG tablet    FLEXERIL    14 tablet    Take 1 tablet (10 mg) by mouth nightly as needed for muscle spasms    Cervicalgia

## 2017-12-19 NOTE — PROGRESS NOTES
SUBJECTIVE:  Neftali Chan is an 32 year old male who presents for not feeling well.  Has htn, ringing in ears, pressure behind eyes, night sweats, chest tightness.  Started about two weeks ago. A couple weeks ago was hit in the head with a metal piece used to put the fishhouse together when broke loose and hit him in forehead with some force.  Had a bump on forehead.  No loc.  Denies confusion.  Did have a headache.  No n/v.    Feels out of breath when walks to mailbox.  If cold outside will cough some.  Some cough at night.  No n/v/d.  No fevers.  Yesterday BP was 212 systolic on his BP at home.  Is currently on augmentin for OM, started four days ago, and feels like sxs are worse. Hands feel a little weak. Highest BP was 238/161.  Neck feels still a lot of the time.      PMH: neg.  Social History     Social History     Marital status:      Spouse name: N/A     Number of children: N/A     Years of education: N/A     Social History Main Topics     Smoking status: Current Every Day Smoker     Packs/day: 0.75     Types: Cigarettes     Smokeless tobacco: Former User     Alcohol use No     Drug use: No     Sexual activity: Not Currently     Other Topics Concern     None     Social History Narrative     Family History   Problem Relation Age of Onset     Asthma Mother      Asthma Father      Asthma Sister      C.A.D. Father      DIABETES No family hx of      Hypertension Father      CEREBROVASCULAR DISEASE Father      Breast Cancer No family hx of      Cancer - colorectal No family hx of      Prostate Cancer No family hx of        ALLERGIES:  Review of patient's allergies indicates no known allergies.    Current Outpatient Prescriptions   Medication     amoxicillin-clavulanate (AUGMENTIN) 875-125 MG per tablet     cyclobenzaprine (FLEXERIL) 10 MG tablet     acetaminophen-codeine (TYLENOL W/CODEINE NO. 3) 300-30 MG per tablet     No current facility-administered medications for this visit.          ROS:  ROS  is done and is negative for general, constitutional, eye, ENT, Respiratory, cardiovascular, GI, , Skin, musculoskeletal except as noted elsewhere.      OBJECTIVE:  BP (!) 141/95  Pulse 86  Temp 98.3  F (36.8  C) (Oral)  Resp 15  Wt 208 lb (94.3 kg)  SpO2 98%  BMI 28.8 kg/m2  GENERAL APPEARANCE: Alert, interactive.  Wearing dark sunglasses and appears a bit uncomfortable.  EYES: sensitive to light.  Eomi, perrla.  Feels lightheaded with eom testing.  No nystagmus  EARS: Rt TM: erythematous and bulging. Lt TM: bulging  NOSE:normal  OROPHARYNX:normal  NECK:No adenopathy,masses or thyromegaly  RESP: normal and clear to auscultation  CV:regular rate and rhythm and no murmurs, clicks, or gallops  ABDOMEN: Abdomen soft, non-tender. BS normal. No masses, organomegaly  SKIN: no ulcers, lesions or rash  MUSCULOSKELETAL:Musculoskeletal normal  NEURO: CN 2-12 grossly intact, but feels dizzy or lightheaded with eye testing.  Strength 4/5 and symmetric in bilateral upper extremities, 5/5 in LEs.  DTRs 2+ and symmetric in bilateral upper and lower extremities.  Sensation to light touch grossly intact in bilateral upper and lower extremities.    RESULTS  No results found for this or any previous visit..  No results found for this or any previous visit (from the past 48 hour(s)).    ASSESSMENT/PLAN:    ASSESSMENT / PLAN:  (R51) Nonintractable headache, unspecified chronicity pattern, unspecified headache type  (primary encounter diagnosis)  Comment: his headache has been persistent and is not improving and at times seems to be worsening.  Combined with neck stiffness, shortness of breath, elevated BP, light sensitivity, current OM, and recent head trauma, I have concerns for more serious issues based on other sxs and exam including concern for meningitis or bleeding which could become life threatening.    Plan: pt to go to ER.  Declines ambulance.  Is to go immediately and directly to ER and is not to eat or drink until they  tell him it is okay.  He reluctantly agrees to go.  His girlfriend states she will take him.  I called [facility] ER to inform of pt.  I advised him that if he did not go to the ER and have further evaluation today, he could become more seriously ill, leading to disability or death.    (S09.90XA) Closed head injury, initial encounter  Comment: two weeks ago  Plan: has had headaches since then.  See plan above, pt to go to ER.     (R03.0) Elevated blood pressure reading without diagnosis of hypertension  Comment: concern for head/brain issues with his other sxs.  Spiking high BPs at home, often into 200s, when checked by his girlfriend who is a nurse  Plan: to ER per plan above.    (L56.8) Light sensitivity  Comment: along with headache and other sxs  Plan: to ER as above.    (R06.02) Shortness of breath  Comment: may be related to his elevated BP and other sxs, but may need further work up for cardiac and resp issues  Plan: to ER as above.      See [facility] for orders, medications, letters, patient instructions    Megan Randhawa M.D.

## 2017-12-19 NOTE — NURSING NOTE
"Chief Complaint   Patient presents with     Hospital F/U     pt was seen 12/14/17, see care everywhere, headache, chest pain and ear pain       Initial BP (!) 141/95  Pulse 86  Temp 98.3  F (36.8  C) (Oral)  Resp 15  Wt 208 lb (94.3 kg)  SpO2 98%  BMI 28.8 kg/m2 Estimated body mass index is 28.8 kg/(m^2) as calculated from the following:    Height as of 11/17/15: 5' 11.26\" (1.81 m).    Weight as of this encounter: 208 lb (94.3 kg).  Medication Reconciliation: complete   Nicole Constantino MA      "

## 2018-02-01 ENCOUNTER — OFFICE VISIT (OUTPATIENT)
Dept: FAMILY MEDICINE | Facility: CLINIC | Age: 33
End: 2018-02-01
Payer: MEDICAID

## 2018-02-01 VITALS
OXYGEN SATURATION: 99 % | TEMPERATURE: 98 F | BODY MASS INDEX: 28.94 KG/M2 | HEART RATE: 69 BPM | SYSTOLIC BLOOD PRESSURE: 133 MMHG | DIASTOLIC BLOOD PRESSURE: 79 MMHG | WEIGHT: 209 LBS

## 2018-02-01 DIAGNOSIS — N20.0 KIDNEY STONE: ICD-10-CM

## 2018-02-01 DIAGNOSIS — K59.00 CONSTIPATION, UNSPECIFIED CONSTIPATION TYPE: Primary | ICD-10-CM

## 2018-02-01 DIAGNOSIS — K21.9 GASTROESOPHAGEAL REFLUX DISEASE, ESOPHAGITIS PRESENCE NOT SPECIFIED: ICD-10-CM

## 2018-02-01 PROCEDURE — 99213 OFFICE O/P EST LOW 20 MIN: CPT | Performed by: PHYSICIAN ASSISTANT

## 2018-02-01 RX ORDER — ONDANSETRON 4 MG/1
4 TABLET, ORALLY DISINTEGRATING ORAL
COMMUNITY
Start: 2018-01-28 | End: 2018-09-17

## 2018-02-01 RX ORDER — NAPROXEN 500 MG/1
500 TABLET ORAL 2 TIMES DAILY WITH MEALS
Qty: 60 TABLET | Refills: 1 | Status: SHIPPED | OUTPATIENT
Start: 2018-02-01 | End: 2019-06-04

## 2018-02-01 RX ORDER — POLYETHYLENE GLYCOL 3350 17 G/17G
1 POWDER, FOR SOLUTION ORAL DAILY
Qty: 510 G | Refills: 1 | Status: SHIPPED | OUTPATIENT
Start: 2018-02-01 | End: 2018-07-25

## 2018-02-01 RX ORDER — TAMSULOSIN HYDROCHLORIDE 0.4 MG/1
0.4 CAPSULE ORAL 2 TIMES DAILY
COMMUNITY
Start: 2018-01-28 | End: 2018-07-25

## 2018-02-01 NOTE — PROGRESS NOTES
SUBJECTIVE:                                                    Neftali Chan is a 32 year old male who presents to clinic today for the following health issues:    ED/UC Followup:    Facility:  [facility]  Date of visit: 1/28/18  Reason for visit: kidney stones - pt was seen at [facility] urology 1/30/18  Current Status: still in pain per pt. Pt has passed 2 of the 3 stones that he was known to have    Also states he hasn't had a BM in couple days. Was on percocet but caused him major headache. Tx: ibu, flomax  and orange juice which is increasing his GERD symptoms.   Care Everywhere Reviewed    Problem list and histories reviewed & adjusted, as indicated.  Additional history: as documented    Patient Active Problem List   Diagnosis     Bipolar 2 disorder (H)     Anxiety     Tobacco abuse     History reviewed. No pertinent surgical history.    Social History   Substance Use Topics     Smoking status: Current Every Day Smoker     Packs/day: 0.75     Types: Cigarettes     Smokeless tobacco: Former User     Alcohol use No     Family History   Problem Relation Age of Onset     Asthma Mother      Asthma Father      C.A.D. Father      Hypertension Father      CEREBROVASCULAR DISEASE Father      Asthma Sister      DIABETES No family hx of      Breast Cancer No family hx of      Cancer - colorectal No family hx of      Prostate Cancer No family hx of          Current Outpatient Prescriptions   Medication Sig Dispense Refill     tamsulosin (FLOMAX) 0.4 MG capsule Take 0.4 mg by mouth 2 times daily        ondansetron (ZOFRAN-ODT) 4 MG ODT tab Place 4 mg under the tongue       omeprazole (PRILOSEC) 20 MG CR capsule Take 1 capsule (20 mg) by mouth daily 30 capsule 1     polyethylene glycol (MIRALAX) powder Take 17 g (1 capful) by mouth daily 510 g 1     naproxen (NAPROSYN) 500 MG tablet Take 1 tablet (500 mg) by mouth 2 times daily (with meals) 60 tablet 1     cyclobenzaprine (FLEXERIL) 10 MG tablet Take 1  tablet (10 mg) by mouth nightly as needed for muscle spasms 14 tablet 0     No Known Allergies    ROS:  Constitutional, HEENT, cardiovascular, pulmonary, gi and gu systems are negative, except as otherwise noted.    OBJECTIVE:     /79  Pulse 69  Temp 98  F (36.7  C) (Oral)  Wt 209 lb (94.8 kg)  SpO2 99%  BMI 28.94 kg/m2  Body mass index is 28.94 kg/(m^2).  GENERAL: healthy, alert and no distress  Head: Normocephalic, atraumatic.  Eyes: Conjunctiva clear, non icteric. PERRLA.  Ears: External ears and TMs normal BL.  Nose: Septum midline, nasal mucosa pink and moist. No discharge.  Mouth / Throat: Normal dentition.  No oral lesions. Pharynx non erythematous, tonsils without hypertrophy.  Neck: Supple, no enlarged LN, trachea midline.  Heart: S1 and S2 normal, no murmurs, clicks, gallops or rubs. Regular rate and rhythm.  Chest: Clear; no wheezes or rales.  The abdomen is soft with mild diffuse tenderness, guarding, mass, rebound or organomegaly. Bowel sounds are normal.  No flank pain.       Diagnostic Test Results:  none     ASSESSMENT/PLAN:         ICD-10-CM    1. Constipation, unspecified constipation type K59.00 polyethylene glycol (MIRALAX) powder   2. Kidney stone N20.0 naproxen (NAPROSYN) 500 MG tablet     UROLOGY ADULT REFERRAL   3. Gastroesophageal reflux disease, esophagitis presence not specified K21.9 omeprazole (PRILOSEC) 20 MG CR capsule   1. Start miralax. warning signs discussed. side effects discussed  2. Follow up  With urology and start naproxen. con't flomax  3. Start omeprazole.   Follow up  As needed     Yohannes Mckenna PA-C  [facility]

## 2018-02-01 NOTE — LETTER
My Depression Action Plan  Name: Neftali Chan   Date of Birth 1985  Date: 2/1/2018    My doctor: [facility], [facility]   My clinic: [facility]  [address] [facility] [address] [phone]          GREEN    ZONE   Good Control    What it looks like:     Things are going generally well. You have normal up s and down s. You may even feel depressed from time to time, but bad moods usually last less than a day.   What you need to do:  1. Continue to care for yourself (see self care plan)  2. Check your depression survival kit and update it as needed  3. Follow your physician s recommendations including any medication.  4. Do not stop taking medication unless you consult with your physician first.           YELLOW         ZONE Getting Worse    What it looks like:     Depression is starting to interfere with your life.     It may be hard to get out of bed; you may be starting to isolate yourself from others.    Symptoms of depression are starting to last most all day and this has happened for several days.     You may have suicidal thoughts but they are not constant.   What you need to do:     1. Call your care team, your response to treatment will improve if you keep your care team informed of your progress. Yellow periods are signs an adjustment may need to be made.     2. Continue your self-care, even if you have to fake it!    3. Talk to someone in your support network    4. Open up your depression survival kit           RED    ZONE Medical Alert - Get Help    What it looks like:     Depression is seriously interfering with your life.     You may experience these or other symptoms: You can t get out of bed most days, can t work or engage in other necessary activities, you have trouble taking care of basic hygiene, or basic responsibilities, thoughts of suicide or death that will not go away, self-injurious behavior.     What you need to do:  1. Call your care  team and request a same-day appointment. If they are not available (weekends or after hours) call your local crisis line, emergency room or 911.      Electronically signed by: Genna Turner, February 1, 2018    Depression Self Care Plan / Survival Kit    Self-Care for Depression  Here s the deal. Your body and mind are really not as separate as most people think.  What you do and think affects how you feel and how you feel influences what you do and think. This means if you do things that people who feel good do, it will help you feel better.  Sometimes this is all it takes.  There is also a place for medication and therapy depending on how severe your depression is, so be sure to consult with your medical provider and/ or Behavioral Health Consultant if your symptoms are worsening or not improving.     In order to better manage my stress, I will:    Exercise  Get some form of exercise, every day. This will help reduce pain and release endorphins, the  feel good  chemicals in your brain. This is almost as good as taking antidepressants!  This is not the same as joining a gym and then never going! (they count on that by the way ) It can be as simple as just going for a walk or doing some gardening, anything that will get you moving.      Hygiene   Maintain good hygiene (Get out of bed in the morning, Make your bed, Brush your teeth, Take a shower, and Get dressed like you were going to work, even if you are unemployed).  If your clothes don't fit try to get ones that do.    Diet  I will strive to eat foods that are good for me, drink plenty of water, and avoid excessive sugar, caffeine, alcohol, and other mood-altering substances.  Some foods that are helpful in depression are: complex carbohydrates, B vitamins, flaxseed, fish or fish oil, fresh fruits and vegetables.    Psychotherapy  I agree to participate in Individual Therapy (if recommended).    Medication  If prescribed medications, I agree to take them.   Missing doses can result in serious side effects.  I understand that drinking alcohol, or other illicit drug use, may cause potential side effects.  I will not stop my medication abruptly without first discussing it with my provider.    Staying Connected With Others  I will stay in touch with my friends, family members, and my primary care provider/team.    Use your imagination  Be creative.  We all have a creative side; it doesn t matter if it s oil painting, sand castles, or mud pies! This will also kick up the endorphins.    Witness Beauty  (AKA stop and smell the roses) Take a look outside, even in mid-winter. Notice colors, textures. Watch the squirrels and birds.     Service to others  Be of service to others.  There is always someone else in need.  By helping others we can  get out of ourselves  and remember the really important things.  This also provides opportunities for practicing all the other parts of the program.    Humor  Laugh and be silly!  Adjust your TV habits for less news and crime-drama and more comedy.    Control your stress  Try breathing deep, massage therapy, biofeedback, and meditation. Find time to relax each day.     My support system    Clinic Contact:  Phone number:    Contact 1:  Phone number:    Contact 2:  Phone number:    [employer]/:  Phone number:    Therapist:  Phone number:    Local crisis center:    Phone number:    Other community support:  Phone number:

## 2018-02-01 NOTE — MR AVS SNAPSHOT
After Visit Summary   2/1/2018    Neftali Chan    MRN: [id number]           Patient Information     Date Of Birth          1985        Visit Information        Provider Department      2/1/2018 10:40 AM Yohannes Mckenna PA-C [facility]        Today's Diagnoses     Constipation, unspecified constipation type    -  1    Kidney stone        Gastroesophageal reflux disease, esophagitis presence not specified           Follow-ups after your visit        Additional Services     UROLOGY ADULT REFERRAL       Your provider has referred you to: [facility]: [facility] [phone]   [email or web address]    Please be aware that coverage of these services is subject to the terms and limitations of your health insurance plan.  Call member services at your health plan with any benefit or coverage questions.      Please bring the following with you to your appointment:    (1) Any X-Rays, CTs or MRIs which have been performed.  Contact the facility where they were done to arrange for  prior to your scheduled appointment.    (2) List of current medications  (3) This referral request   (4) Any documents/labs given to you for this referral                  Who to contact     If you have questions or need follow up information about today's clinic visit or your schedule please contact [facility] directly at [phone].  Normal or non-critical lab and imaging results will be communicated to you by MyChart, letter or phone within 4 business days after the clinic has received the results. If you do not hear from us within 7 days, please contact the clinic through MyChart or phone. If you have a critical or abnormal lab result, we will notify you by phone as soon as possible.  Submit refill requests through [employer] or call your pharmacy and they will forward the refill request to us. Please allow 3 business  "days for your refill to be completed.          Additional Information About Your Visit        [facility] Information     [employer] lets you send messages to your doctor, view your test results, renew your prescriptions, schedule appointments and more. To sign up, go to [email or web address] . Click on \"Log in\" on the left side of the screen, which will take you to the Welcome page. Then click on \"Sign up Now\" on the right side of the page.     You will be asked to enter the access code listed below, as well as some personal information. Please follow the directions to create your username and password.     Your access code is: [email or web address]  Expires: 3/15/2018  6:43 PM     Your access code will  in 90 days. If you need help or a new code, please call your [place] clinic or [phone].        Care EveryWhere ID     This is your Care EveryWhere ID. This could be used by other organizations to access your [place] medical records  [id number]        Your Vitals Were     Pulse Temperature Pulse Oximetry BMI (Body Mass Index)          69 98  F (36.7  C) (Oral) 99% 28.94 kg/m2         Blood Pressure from Last 3 Encounters:   18 133/79   17 (!) 141/95   12/15/17 (!) 160/94    Weight from Last 3 Encounters:   18 209 lb (94.8 kg)   17 208 lb (94.3 kg)   12/15/17 212 lb 9.6 oz (96.4 kg)              We Performed the Following     UROLOGY ADULT REFERRAL          Today's Medication Changes          These changes are accurate as of 18 10:54 AM.  If you have any questions, ask your nurse or doctor.               Start taking these medicines.        Dose/Directions    naproxen 500 MG tablet   Commonly known as:  NAPROSYN   Used for:  Kidney stone   Started by:  Yohannes Mckenna PA-C        Dose:  500 mg   Take 1 tablet (500 mg) by mouth 2 times daily (with meals)   Quantity:  60 tablet   Refills:  1       omeprazole 20 MG CR capsule   Commonly known as:  priLOSEC   Used for:  " Gastroesophageal reflux disease, esophagitis presence not specified   Started by:  Yohannes Mckenna PA-C        Dose:  20 mg   Take 1 capsule (20 mg) by mouth daily   Quantity:  30 capsule   Refills:  1       polyethylene glycol powder   Commonly known as:  MIRALAX   Used for:  Constipation, unspecified constipation type   Started by:  Yohannes Mckenna PA-C        Dose:  1 capful   Take 17 g (1 capful) by mouth daily   Quantity:  510 g   Refills:  1            Where to get your medicines      These medications were sent to [facility] [place] - [address]  [address] [place] [address], [place] [address]     Phone:  [phone]     naproxen 500 MG tablet    omeprazole 20 MG CR capsule    polyethylene glycol powder                Primary Care Provider Office Phone # Fax #    [facility] [phone] [address] [facility] [address]        Equal Access to Services     MATT MENCHACA AH: Hadii christina ku hadasho Soomaali, waaxda luqadaha, qaybta kaalmada adeegyada, waxay melissain hayjodyn [facility] . So [facility] [phone].    ATENCIÓN: Si habla español, tiene a dwyer disposición servicios gratuitos de asistencia lingüística. Llame al [phone].    We comply with applicable federal civil rights laws and [place] laws. We do not discriminate on the basis of race, color, national origin, age, disability, sex, sexual orientation, or gender identity.            Thank you!     Thank you for choosing [facility]  for your care. Our goal is always to provide you with excellent care. Hearing back from our patients is one way we can continue to improve our services. Please take a few minutes to complete the written survey that you may receive in the mail after your visit with us. Thank you!             Your Updated Medication List - Protect others around you: Learn how to safely use, store and throw away your  medicines at www.disposemymeds.org.          This list is accurate as of 2/1/18 10:54 AM.  Always use your most recent med list.                   Brand Name Dispense Instructions for use Diagnosis    cyclobenzaprine 10 MG tablet    FLEXERIL    14 tablet    Take 1 tablet (10 mg) by mouth nightly as needed for muscle spasms    Cervicalgia       naproxen 500 MG tablet    NAPROSYN    60 tablet    Take 1 tablet (500 mg) by mouth 2 times daily (with meals)    Kidney stone       omeprazole 20 MG CR capsule    priLOSEC    30 capsule    Take 1 capsule (20 mg) by mouth daily    Gastroesophageal reflux disease, esophagitis presence not specified       ondansetron 4 MG ODT tab    ZOFRAN-ODT     Place 4 mg under the tongue        polyethylene glycol powder    MIRALAX    510 g    Take 17 g (1 capful) by mouth daily    Constipation, unspecified constipation type       tamsulosin 0.4 MG capsule    FLOMAX     Take 0.4 mg by mouth 2 times daily

## 2018-02-01 NOTE — NURSING NOTE
"Chief Complaint   Patient presents with     Hospital F/U       Initial /79  Pulse 69  Temp 98  F (36.7  C) (Oral)  Wt 209 lb (94.8 kg)  SpO2 99%  BMI 28.94 kg/m2 Estimated body mass index is 28.94 kg/(m^2) as calculated from the following:    Height as of 11/17/15: 5' 11.26\" (1.81 m).    Weight as of this encounter: 209 lb (94.8 kg).  Medication Reconciliation: complete  Genna Perdue CMA    "

## 2018-02-02 ASSESSMENT — PATIENT HEALTH QUESTIONNAIRE - PHQ9: SUM OF ALL RESPONSES TO PHQ QUESTIONS 1-9: 8

## 2018-07-25 ENCOUNTER — OFFICE VISIT (OUTPATIENT)
Dept: FAMILY MEDICINE | Facility: CLINIC | Age: 33
End: 2018-07-25
Payer: COMMERCIAL

## 2018-07-25 VITALS
HEIGHT: 71 IN | DIASTOLIC BLOOD PRESSURE: 79 MMHG | BODY MASS INDEX: 29.12 KG/M2 | TEMPERATURE: 98 F | SYSTOLIC BLOOD PRESSURE: 133 MMHG | RESPIRATION RATE: 16 BRPM | OXYGEN SATURATION: 99 % | WEIGHT: 208 LBS | HEART RATE: 69 BPM

## 2018-07-25 DIAGNOSIS — R51.9 CHRONIC NONINTRACTABLE HEADACHE, UNSPECIFIED HEADACHE TYPE: Primary | ICD-10-CM

## 2018-07-25 DIAGNOSIS — G89.29 CHRONIC NONINTRACTABLE HEADACHE, UNSPECIFIED HEADACHE TYPE: Primary | ICD-10-CM

## 2018-07-25 PROCEDURE — 99213 OFFICE O/P EST LOW 20 MIN: CPT | Performed by: PHYSICIAN ASSISTANT

## 2018-07-25 RX ORDER — SUMATRIPTAN 50 MG/1
50 TABLET, FILM COATED ORAL
Qty: 18 TABLET | Refills: 1 | Status: SHIPPED | OUTPATIENT
Start: 2018-07-25 | End: 2019-06-04

## 2018-07-25 RX ORDER — LISINOPRIL/HYDROCHLOROTHIAZIDE 10-12.5 MG
TABLET ORAL
COMMUNITY
Start: 2018-03-21 | End: 2019-06-04

## 2018-07-25 RX ORDER — METHOCARBAMOL 750 MG/1
750 TABLET, FILM COATED ORAL
COMMUNITY
Start: 2018-03-21 | End: 2019-06-04

## 2018-07-25 ASSESSMENT — ENCOUNTER SYMPTOMS
LOSS OF CONSCIOUSNESS: 0
SPEECH CHANGE: 0
SEIZURES: 0
PALPITATIONS: 0
FEVER: 0
CARDIOVASCULAR NEGATIVE: 1
HEMOPTYSIS: 0
HEADACHES: 1
RESPIRATORY NEGATIVE: 1
COUGH: 0
EYE PAIN: 0
SENSORY CHANGE: 0
TINGLING: 0
FOCAL WEAKNESS: 0
CONSTITUTIONAL NEGATIVE: 1
WEIGHT LOSS: 0
DIZZINESS: 1
DIAPHORESIS: 0
TREMORS: 0
GASTROINTESTINAL NEGATIVE: 1

## 2018-07-25 ASSESSMENT — PAIN SCALES - GENERAL: PAINLEVEL: SEVERE PAIN (6)

## 2018-07-25 NOTE — PROGRESS NOTES
SUBJECTIVE:     HPI  Neftali Chan is a 33 year old male who presents to clinic today for the following health issues:  Headaches    Duration: 3 weeks.  No known triggers.    Description  Location: bilateral in the temporal area, bilateral in the occipital area   Character: throbbing pain, sharp pain    Intensity:  severe    Accompanying signs and symptoms:    Precipitating or Alleviating factors:  Nausea/vomiting: no  Dizziness: usually  Weakness, tingling numbness: no  Visual changes: blurry  Fever: no   Sinus or URI symptoms:  No cough, shortness of breath or wheezing.  No sore throat or sinus congestion/pain/pressure.       History  Head trauma: YES, more than 10 yrs ago  Family history of migraines: YES in his Mom  Previous tests for headaches: Yes, MRI which was negative  Neurologist evaluations: no  Able to do daily activities when headache present: YES  Wake with headaches: YES  Daily pain medication use: YES  Any changes in: none    Therapies tried and outcome: Ibuprofen (Advil, Motrin), Naproxyn (Aleve) and Excedrin    Outcome - not effective  Frequent/daily pain medication use: YES      Reviewed PMH, FMH and SOH.  Patient Active Problem List   Diagnosis     Bipolar 2 disorder (H)     Anxiety     Tobacco abuse     Current Outpatient Prescriptions   Medication Sig Dispense Refill     cyclobenzaprine (FLEXERIL) 10 MG tablet Take 1 tablet (10 mg) by mouth nightly as needed for muscle spasms (Patient not taking: Reported on 7/25/2018) 14 tablet 0     lisinopril-hydrochlorothiazide (PRINZIDE/ZESTORETIC) 10-12.5 MG per tablet        methocarbamol (ROBAXIN) 750 MG tablet Take 750 mg by mouth       naproxen (NAPROSYN) 500 MG tablet Take 1 tablet (500 mg) by mouth 2 times daily (with meals) 60 tablet 1     omeprazole (PRILOSEC) 20 MG CR capsule Take 1 capsule (20 mg) by mouth daily (Patient not taking: Reported on 7/25/2018) 30 capsule 1     ondansetron (ZOFRAN-ODT) 4 MG ODT tab Place 4 mg under the tongue  "      No Known Allergies    Review of Systems   Constitutional: Negative.  Negative for diaphoresis, fever and weight loss.   HENT: Negative.    Eyes: Negative for pain.   Respiratory: Negative.  Negative for cough and hemoptysis.    Cardiovascular: Negative.  Negative for chest pain and palpitations.   Gastrointestinal: Negative.    Skin: Negative.    Neurological: Positive for dizziness and headaches. Negative for tingling, tremors, sensory change, speech change, focal weakness, seizures and loss of consciousness.   All other systems reviewed and are negative.      /79  Pulse 69  Temp 98  F (36.7  C) (Oral)  Resp 16  Ht 5' 11\" (1.803 m)  Wt 208 lb (94.3 kg)  SpO2 99%  BMI 29.01 kg/m2  Physical Exam   Constitutional: He is oriented to person, place, and time and well-developed, well-nourished, and in no distress. No distress.   HENT:   Head: Normocephalic and atraumatic.   Mouth/Throat: Uvula is midline and oropharynx is clear and moist. No oropharyngeal exudate.   TMs are intact without any erythema or bulging bilaterally.  Airway is patent.   Eyes: Conjunctivae and EOM are normal. Pupils are equal, round, and reactive to light. No scleral icterus.   Neck: Normal range of motion and full passive range of motion without pain. Neck supple. No Brudzinski's sign and no Kernig's sign noted. No thyromegaly present.   Cardiovascular: Normal rate, regular rhythm, normal heart sounds and intact distal pulses.  Exam reveals no gallop and no friction rub.    No murmur heard.  Pulmonary/Chest: Effort normal and breath sounds normal. No respiratory distress. He has no wheezes. He has no rales.   Lymphadenopathy:     He has no cervical adenopathy.   Neurological: He is alert and oriented to person, place, and time. He has normal motor skills, normal sensation, normal strength, normal reflexes and intact cranial nerves. He displays no weakness, facial symmetry and normal speech. No cranial nerve deficit. He has a " normal Romberg Test. He shows no pronator drift. Gait normal. Coordination normal.   Skin: Skin is warm and dry. He is not diaphoretic.   Psychiatric: Mood, memory, affect and judgment normal.   Nursing note and vitals reviewed.        Assessment/Plan:  Chronic nonintractable headache, unspecified headache type:  No focal neurologic deficits. Will give trial of imitrex as directed. Avoid triggers.  Keep HA diary.  Avoid caffeine, get regular sleep, adequate nutrition, stress reduction.  Will also send to neurology for further evaluation and management as this appears to be chronic.  To the ER if he develops worsening HAs, visual changes, one sided weakness or slurred speech.    -     SUMAtriptan (IMITREX) 50 MG tablet; Take 1 tablet (50 mg) by mouth at onset of headache for migraine May repeat in 2 hours. Max 4 tablets/24 hours.  -     NEUROLOGY ADULT REFERRAL          Lissette Wright PA-C

## 2018-07-25 NOTE — MR AVS SNAPSHOT
After Visit Summary   7/25/2018    Neftali Chan    MRN: [id number]           Patient Information     Date Of Birth          1985        Visit Information        Provider Department      7/25/2018 2:20 PM Lissette Wright PA-C [facility]        Today's Diagnoses     Chronic nonintractable headache, unspecified headache type    -  1       Follow-ups after your visit        Additional Services     NEUROLOGY ADULT REFERRAL       Your provider has referred you for the following:   Consult at [facility]:  G: [facility] [place] [phone]   [email or web address]  UM: [facility] [place] [phone]   [email or web address]    Please be aware that coverage of these services is subject to the terms and limitations of your health insurance plan.  Call member services at your health plan with any benefit or coverage questions.      Please bring the following with you to your appointment:    (1) Any X-Rays, CTs or MRIs which have been performed.  Contact the facility where they were done to arrange for  prior to your scheduled appointment.    (2) List of current medications  (3) This referral request   (4) Any documents/labs given to you for this referral                  Who to contact     If you have questions or need follow up information about today's clinic visit or your schedule please contact [facility] directly at [phone].  Normal or non-critical lab and imaging results will be communicated to you by MyChart, letter or phone within 4 business days after the clinic has received the results. If you do not hear from us within 7 days, please contact the clinic through MyChart or phone. If you have a critical or abnormal lab result, we will notify you by phone as soon as possible.  Submit refill requests  "through [employer] or call your pharmacy and they will forward the refill request to us. Please allow 3 business days for your refill to be completed.          Additional Information About Your Visit        Care EveryWhere ID     This is your Care EveryWhere ID. This could be used by other organizations to access your [facility] medical records  [id number]        Your Vitals Were     Pulse Temperature Respirations Height Pulse Oximetry BMI (Body Mass Index)    69 98  F (36.7  C) (Oral) 16 5' 11\" (1.803 m) 99% 29.01 kg/m2       Blood Pressure from Last 3 Encounters:   07/25/18 133/79   02/01/18 133/79   12/19/17 (!) 141/95    Weight from Last 3 Encounters:   07/25/18 208 lb (94.3 kg)   02/01/18 209 lb (94.8 kg)   12/19/17 208 lb (94.3 kg)              We Performed the Following     NEUROLOGY ADULT REFERRAL          Today's Medication Changes          These changes are accurate as of 7/25/18  2:44 PM.  If you have any questions, ask your nurse or doctor.               Start taking these medicines.        Dose/Directions    SUMAtriptan 50 MG tablet   Commonly known as:  IMITREX   Used for:  Chronic nonintractable headache, unspecified headache type   Started by:  Lissette Wright PA-C        Dose:  50 mg   Take 1 tablet (50 mg) by mouth at onset of headache for migraine May repeat in 2 hours. Max 4 tablets/24 hours.   Quantity:  18 tablet   Refills:  1            Where to get your medicines      These medications were sent to [facility] - [place], [place] - [address]  [address] [address]     Phone:  [phone]     SUMAtriptan 50 MG tablet                Primary Care Provider Office Phone # Fax #    [facility] [phone] [address] [address] [address]        Equal Access to Services     MATT MENCHACA AH: Gerson hsieho Soomaali, waaxda luqadaha, qaybta kaalmada adeegyada, waxay susan etienne. So wa " [phone].    ATENCIÓN: Si tabby garcia, tiene a dwyer disposición servicios gratuitos de asistencia lingüística. Rosario alfonso [phone].    We comply with applicable federal civil rights laws and [place] laws. We do not discriminate on the basis of race, color, national origin, age, disability, sex, sexual orientation, or gender identity.            Thank you!     Thank you for choosing [facility]  [facility]. Our goal is always to provide you with excellent care. Hearing back from our patients is one way we can continue to improve our services. Please take a few minutes to complete the written survey that you may receive in the mail after your visit with us. Thank you!             Your Updated Medication List - Protect others around you: Learn how to safely use, store and throw away your medicines at www.disposemymeds.org.          This list is accurate as of 7/25/18  2:44 PM.  Always use your most recent med list.                   Brand Name Dispense Instructions for use Diagnosis    cyclobenzaprine 10 MG tablet    FLEXERIL    14 tablet    Take 1 tablet (10 mg) by mouth nightly as needed for muscle spasms    Cervicalgia       lisinopril-hydrochlorothiazide 10-12.5 MG per tablet    PRINZIDE/ZESTORETIC          methocarbamol 750 MG tablet    ROBAXIN     Take 750 mg by mouth        naproxen 500 MG tablet    NAPROSYN    60 tablet    Take 1 tablet (500 mg) by mouth 2 times daily (with meals)    Kidney stone       omeprazole 20 MG CR capsule    priLOSEC    30 capsule    Take 1 capsule (20 mg) by mouth daily    Gastroesophageal reflux disease, esophagitis presence not specified       ondansetron 4 MG ODT tab    ZOFRAN-ODT     Place 4 mg under the tongue        SUMAtriptan 50 MG tablet    IMITREX    18 tablet    Take 1 tablet (50 mg) by mouth at onset of headache for migraine May repeat in 2 hours. Max 4 tablets/24 hours.    Chronic nonintractable headache, unspecified headache type

## 2018-09-17 ENCOUNTER — OFFICE VISIT (OUTPATIENT)
Dept: FAMILY MEDICINE | Facility: CLINIC | Age: 33
End: 2018-09-17
Payer: COMMERCIAL

## 2018-09-17 ENCOUNTER — RADIANT APPOINTMENT (OUTPATIENT)
Dept: GENERAL RADIOLOGY | Facility: CLINIC | Age: 33
End: 2018-09-17
Attending: NURSE PRACTITIONER
Payer: COMMERCIAL

## 2018-09-17 VITALS
WEIGHT: 207 LBS | SYSTOLIC BLOOD PRESSURE: 143 MMHG | BODY MASS INDEX: 28.87 KG/M2 | TEMPERATURE: 98.7 F | DIASTOLIC BLOOD PRESSURE: 84 MMHG | HEART RATE: 78 BPM

## 2018-09-17 DIAGNOSIS — M25.561 ACUTE PAIN OF RIGHT KNEE: ICD-10-CM

## 2018-09-17 DIAGNOSIS — M25.561 ACUTE PAIN OF RIGHT KNEE: Primary | ICD-10-CM

## 2018-09-17 PROCEDURE — 73562 X-RAY EXAM OF KNEE 3: CPT | Mod: RT

## 2018-09-17 PROCEDURE — 99214 OFFICE O/P EST MOD 30 MIN: CPT | Performed by: NURSE PRACTITIONER

## 2018-09-17 NOTE — MR AVS SNAPSHOT
After Visit Summary   9/17/2018    Neftali Chan    MRN: [id number]           Patient Information     Date Of Birth          1985        Visit Information        Provider Department      9/17/2018 1:00 PM Nikkie Mike APRN CNP [facility]        Today's Diagnoses     Acute pain of right knee    -  1       Follow-ups after your visit        Additional Services     ORTHO  REFERRAL       [facility] is referring you to the Orthopedic  Services at [facility].       The  Representative will assist you in the coordination of your Orthopedic and Musculoskeletal Care as prescribed by your physician.    The  Representative will call you within 1 business day to help schedule your appointment, or you may contact the  Representative at:    All areas ~ [phone]     Type of Referral : Non Surgical       Timeframe requested: 1 - 2 days    Coverage of these services is subject to the terms and limitations of your health insurance plan.  Please call member services at your health plan with any benefit or coverage questions.      If X-rays, CT or MRI's have been performed, please contact the facility where they were done to arrange for , prior to your scheduled appointment.  Please bring this referral request to your appointment and present it to your specialist.                  Who to contact     If you have questions or need follow up information about today's clinic visit or your schedule please contact [facility] directly at [phone].  Normal or non-critical lab and imaging results will be communicated to you by MyChart, letter or phone within 4 business days after the clinic has received the results. If you do not hear from us within 7 days, please contact the clinic through MyChart or phone. If you have a critical or abnormal lab result, we will notify you by phone as  soon as possible.  Submit refill requests through [employer] or call your pharmacy and they will forward the refill request to us. Please allow 3 business days for your refill to be completed.          Additional Information About Your Visit        Care EveryWhere ID     This is your Care EveryWhere ID. This could be used by other organizations to access your [facility] medical records  [id number]        Your Vitals Were     Pulse Temperature BMI (Body Mass Index)             78 98.7  F (37.1  C) (Oral) 28.87 kg/m2          Blood Pressure from Last 3 Encounters:   09/17/18 143/84   07/25/18 133/79   02/01/18 133/79    Weight from Last 3 Encounters:   09/17/18 207 lb (93.9 kg)   07/25/18 208 lb (94.3 kg)   02/01/18 209 lb (94.8 kg)              We Performed the Following     ORTHO  REFERRAL        Primary Care Provider Office Phone # Fax #    [facility] [phone] [address] [address] [address]        Equal Access to Services     MATT MENCHACA : Hadii aad ku hadasho Soomaali, waaxda luqadaha, qaybta kaalmada adeegyada, waxay susan hsu . So [facility] [phone].    ATENCIÓN: Si habla español, tiene a dwyer disposición servicios gratuitos de asistencia lingüística. Llame al [phone].    We comply with applicable federal civil rights laws and [place] laws. We do not discriminate on the basis of race, color, national origin, age, disability, sex, sexual orientation, or gender identity.            Thank you!     Thank you for choosing [facility]  for your care. Our goal is always to provide you with excellent care. Hearing back from our patients is one way we can continue to improve our services. Please take a few minutes to complete the written survey that you may receive in the mail after your visit with us. Thank you!             Your Updated Medication List - Protect others around you: Learn how to safely use, store and throw away  your medicines at www.disposemymeds.org.          This list is accurate as of 9/17/18  2:03 PM.  Always use your most recent med list.                   Brand Name Dispense Instructions for use Diagnosis    lisinopril-hydrochlorothiazide 10-12.5 MG per tablet    PRINZIDE/ZESTORETIC          methocarbamol 750 MG tablet    ROBAXIN     Take 750 mg by mouth        naproxen 500 MG tablet    NAPROSYN    60 tablet    Take 1 tablet (500 mg) by mouth 2 times daily (with meals)    Kidney stone       SUMAtriptan 50 MG tablet    IMITREX    18 tablet    Take 1 tablet (50 mg) by mouth at onset of headache for migraine May repeat in 2 hours. Max 4 tablets/24 hours.    Chronic nonintractable headache, unspecified headache type

## 2018-09-17 NOTE — PROGRESS NOTES
SUBJECTIVE:   Neftali Chan is a 33 year old male who presents to clinic today for the following health issues:      Musculoskeletal problem/pain      Duration: X 3 days    Description  Location: right knee    Intensity:  moderate    Accompanying signs and symptoms: none    History  Previous similar problem: YES  Previous evaluation:  In past, has had chronic knee problems. Tore LCL 2007. Has been giving him problems since then off an don.     Precipitating or alleviating factors:  Trauma or overuse: no  Aggravating factors include: walking and weight bearing    Therapies tried and outcome: rest/inactivity, heat and ice    Problem list and histories reviewed & adjusted, as indicated.  Additional history: as documented    Patient Active Problem List   Diagnosis     Bipolar 2 disorder (H)     Anxiety     Tobacco abuse     No past surgical history on file.    Social History   Substance Use Topics     Smoking status: Current Every Day Smoker     Packs/day: 0.75     Types: Cigarettes     Smokeless tobacco: Former User     Alcohol use No     Family History   Problem Relation Age of Onset     Asthma Mother      Asthma Father      C.A.D. Father      Hypertension Father      Cerebrovascular Disease Father      Asthma Sister      Diabetes No family hx of      Breast Cancer No family hx of      Cancer - colorectal No family hx of      Prostate Cancer No family hx of            Reviewed and updated as needed this visit by clinical staff       Reviewed and updated as needed this visit by Provider         ROS:  Constitutional, HEENT, cardiovascular, pulmonary, GI, , musculoskeletal, neuro, skin, endocrine and psych systems are negative, except as otherwise noted.    OBJECTIVE:     /84  Pulse 78  Temp 98.7  F (37.1  C) (Oral)  Wt 207 lb (93.9 kg)  BMI 28.87 kg/m2  Body mass index is 28.87 kg/(m^2).  GENERAL: alert and no distress  RESP: lungs clear to auscultation - no rales, rhonchi or wheezes  CV: regular  rate and rhythm, normal S1 S2, no S3 or S4, no murmur, click or rub, no peripheral edema and peripheral pulses strong  SKIN: no suspicious lesions or rashes  Knees reveal full range of motion, no tenderness, masses, effusion or ligamentous instability.    Diagnostic Test Results:  FINDINGS: There is no significant degenerative change. No  suprapatellar effusion. There is no acute fracture. No dislocation.  Sunrise views appear aligned.  Nonaggressive sclerotic lesion in the  anterior distal femur, probably a bone island.    ASSESSMENT/PLAN:     1. Acute pain of right knee    Discussed xray results (r/o fracture, dislocation)  Likely more tendon ligament strain or tear  Will ice, elevate, ibuprofen and follow up with orthof if needed (referral placed)      JOSELO Colon [facility]

## 2019-06-04 ENCOUNTER — OFFICE VISIT (OUTPATIENT)
Dept: FAMILY MEDICINE | Facility: CLINIC | Age: 34
End: 2019-06-04
Payer: COMMERCIAL

## 2019-06-04 ENCOUNTER — ANCILLARY PROCEDURE (OUTPATIENT)
Dept: GENERAL RADIOLOGY | Facility: CLINIC | Age: 34
End: 2019-06-04
Attending: INTERNAL MEDICINE
Payer: COMMERCIAL

## 2019-06-04 ENCOUNTER — OFFICE VISIT (OUTPATIENT)
Dept: BEHAVIORAL HEALTH | Facility: CLINIC | Age: 34
End: 2019-06-04
Payer: COMMERCIAL

## 2019-06-04 VITALS
RESPIRATION RATE: 16 BRPM | BODY MASS INDEX: 27.89 KG/M2 | HEART RATE: 63 BPM | DIASTOLIC BLOOD PRESSURE: 77 MMHG | SYSTOLIC BLOOD PRESSURE: 138 MMHG | TEMPERATURE: 98.4 F | WEIGHT: 200 LBS | OXYGEN SATURATION: 99 %

## 2019-06-04 DIAGNOSIS — R19.5 CHANGE IN CONSISTENCY OF STOOL: ICD-10-CM

## 2019-06-04 DIAGNOSIS — R69 DIAGNOSIS DEFERRED: Primary | ICD-10-CM

## 2019-06-04 DIAGNOSIS — Z87.448 HISTORY OF CHANGES URINE OUTPUT CHANGES: Primary | ICD-10-CM

## 2019-06-04 LAB
ALBUMIN SERPL-MCNC: 4 G/DL (ref 3.4–5)
ALBUMIN UR-MCNC: NEGATIVE MG/DL
ANION GAP SERPL CALCULATED.3IONS-SCNC: 5 MMOL/L (ref 3–14)
APPEARANCE UR: CLEAR
BILIRUB UR QL STRIP: NEGATIVE
BUN SERPL-MCNC: 16 MG/DL (ref 7–30)
CALCIUM SERPL-MCNC: 8.8 MG/DL (ref 8.5–10.1)
CHLORIDE SERPL-SCNC: 107 MMOL/L (ref 94–109)
CO2 SERPL-SCNC: 29 MMOL/L (ref 20–32)
COLOR UR AUTO: YELLOW
CREAT SERPL-MCNC: 1.01 MG/DL (ref 0.66–1.25)
GFR SERPL CREATININE-BSD FRML MDRD: >90 ML/MIN/{1.73_M2}
GLUCOSE SERPL-MCNC: 93 MG/DL (ref 70–99)
GLUCOSE UR STRIP-MCNC: NEGATIVE MG/DL
HGB UR QL STRIP: ABNORMAL
KETONES UR STRIP-MCNC: NEGATIVE MG/DL
LEUKOCYTE ESTERASE UR QL STRIP: NEGATIVE
NITRATE UR QL: NEGATIVE
PH UR STRIP: 6 PH (ref 5–7)
PHOSPHATE SERPL-MCNC: 3.1 MG/DL (ref 2.5–4.5)
POTASSIUM SERPL-SCNC: 4.2 MMOL/L (ref 3.4–5.3)
RBC #/AREA URNS AUTO: NORMAL /HPF
SODIUM SERPL-SCNC: 141 MMOL/L (ref 133–144)
SOURCE: ABNORMAL
SP GR UR STRIP: 1.02 (ref 1–1.03)
UROBILINOGEN UR STRIP-ACNC: 0.2 EU/DL (ref 0.2–1)
WBC #/AREA URNS AUTO: NORMAL /HPF

## 2019-06-04 PROCEDURE — 74019 RADEX ABDOMEN 2 VIEWS: CPT

## 2019-06-04 PROCEDURE — 36415 COLL VENOUS BLD VENIPUNCTURE: CPT | Performed by: INTERNAL MEDICINE

## 2019-06-04 PROCEDURE — 80069 RENAL FUNCTION PANEL: CPT | Performed by: INTERNAL MEDICINE

## 2019-06-04 PROCEDURE — 81001 URINALYSIS AUTO W/SCOPE: CPT | Performed by: INTERNAL MEDICINE

## 2019-06-04 PROCEDURE — 99214 OFFICE O/P EST MOD 30 MIN: CPT | Performed by: INTERNAL MEDICINE

## 2019-06-04 NOTE — PATIENT INSTRUCTIONS
Follow up with your primary doctor if your stools do not improve over the next two weeks with taking fiber daily.

## 2019-06-04 NOTE — PROGRESS NOTES
[facility]  No data recorded      Social Work Care Navigator Note      Patient: Neftali Ramos No  Date: June 4, 2019  Preferred Name: ELEAZAR    Previous PHQ-9:   PHQ-9 SCORE 3/12/2014 11/20/2015 2/1/2018   PHQ-9 Total Score 9 - -   PHQ-9 Total Score - 0 8     Previous AUDI-7:   AUDI-7 SCORE 3/12/2014   Total Score 9     JAKI LEVEL:  JAKI Score (Last Two) 3/12/2014   JAKI Raw Score 41   Activation Score 63.2   JAKI Level 3       Preferred Contact:  No data recorded    Type of Contact Today: Face to Face in Clinic      Data: (subjective / Objective):   met with patient and introduced self/role. [facility] program was offered to patient. Patient declined.     Patient response to [facility] Service offering:   Not interested enrolling in [facility] services    Gilberto Garcia, Social Work Care Coordinator

## 2019-06-04 NOTE — NURSING NOTE
"Chief Complaint   Patient presents with     Urinary Problem     pt pt c/o decreasd urine output, dark urine x 3-4 months. Loose stool hx of constipation. He also c/o upper back pain. Pt has a hx of Kidney stones       Initial /77   Pulse 63   Temp 98.4  F (36.9  C) (Oral)   Resp 16   Wt 90.7 kg (200 lb)   SpO2 99%   BMI 27.89 kg/m   Estimated body mass index is 27.89 kg/m  as calculated from the following:    Height as of 7/25/18: 1.803 m (5' 11\").    Weight as of this encounter: 90.7 kg (200 lb).  Medication Reconciliation: complete  Nicole Constantino MA    "

## 2019-06-04 NOTE — PROGRESS NOTES
SUBJECTIVE:  Neftali Chan is an 33 year old male who presents for some concerns.  Over past five months has had soft stools.  Does have a h/o constipation.  Also has a little bit of upper back pain.  Having less frequent urination, two to three times a day.  Does drink a lot of water.  When first wakes up urine is dark and has a different smell to it.  By later in the day urine is lighter.  No recent trauma or injuries.  No fevers, chills, sweats.  Has had kidney stones in past.  Recently no pain like kidney stones, but feels like has a little pain in his left side sometimes.  No fevers, chills, sweats.  No pain with urination.  Used to have BM every 2-3 days but now has about 3 or 4 a day and are soft and mushy.  No blood in stools.  Travel to [place] in Feb and seemed the same there.  No meds taken for sxs.  Used to be on htn med but stopped it as it caused headaches.  When has a BM, does get some cramping pain.  No blood in stools or dark stools.  Pt reports a painful bump on his scrotum for past year, but does not want to address that today and does not want it checked or to talk about it.    PMH:  Patient Active Problem List   Diagnosis     Bipolar 2 disorder (H)     Anxiety     Tobacco abuse     Social History     Socioeconomic History     Marital status:      Spouse name: None     Number of children: None     Years of education: None     Highest education level: None   Occupational History     None   Social Needs     Financial resource strain: None     Food insecurity:     Worry: None     Inability: None     Transportation needs:     Medical: None     Non-medical: None   Tobacco Use     Smoking status: Current Every Day Smoker     Packs/day: 0.75     Types: Cigarettes     Smokeless tobacco: Former User   Substance and Sexual Activity     Alcohol use: No     Drug use: No     Sexual activity: Yes     Partners: Female   Lifestyle     Physical activity:     Days per week: None     Minutes per  session: None     Stress: None   Relationships     Social connections:     Talks on phone: None     Gets together: None     Attends Restorationist service: None     Active member of club or organization: None     Attends meetings of clubs or organizations: None     Relationship status: None     Intimate partner violence:     Fear of current or ex partner: None     Emotionally abused: None     Physically abused: None     Forced sexual activity: None   Other Topics Concern     Parent/sibling w/ CABG, MI or angioplasty before 65F 55M? Not Asked   Social History Narrative     None     Family History   Problem Relation Age of Onset     Asthma Mother      Asthma Father      C.A.D. Father      Hypertension Father      Cerebrovascular Disease Father      Asthma Sister      Diabetes No family hx of      Breast Cancer No family hx of      Cancer - colorectal No family hx of      Prostate Cancer No family hx of        ALLERGIES:  Patient has no known allergies.    No current outpatient medications on file.     No current facility-administered medications for this visit.          ROS:  ROS is done and is negative for general/constitutional, eye, ENT, Respiratory, cardiovascular, GI, , Skin, musculoskeletal except as noted elsewhere.  All other review of systems negative except as noted elsewhere.      OBJECTIVE:  /77   Pulse 63   Temp 98.4  F (36.9  C) (Oral)   Resp 16   Wt 90.7 kg (200 lb)   SpO2 99%   BMI 27.89 kg/m    GENERAL APPEARANCE: Alert, in no acute distress  EYES: normal  NOSE:normal  OROPHARYNX:normal  NECK:No adenopathy,masses or thyromegaly  RESP: normal and clear to auscultation  CV:regular rate and rhythm and no murmurs, clicks, or gallops  ABDOMEN: Abdomen soft, non-tender. BS normal. No masses, organomegaly  SKIN: no ulcers, lesions or rash  MUSCULOSKELETAL:Musculoskeletal normal      RESULTS  Results for orders placed or performed in visit on 06/04/19   *UA reflex to Microscopic and Culture ([facility]  and [facility] (except [place] and [place])   Result Value Ref [facility]    Color Urine Yellow     Appearance Urine Clear     Glucose Urine Negative NEG^Negative mg/dL    Bilirubin Urine Negative NEG^Negative    Ketones Urine Negative NEG^Negative mg/dL    Specific Gravity Urine 1.020 1.003 - 1.035    Blood Urine Trace (A) NEG^Negative    pH Urine 6.0 5.0 - 7.0 pH    Protein Albumin Urine Negative NEG^Negative mg/dL    Urobilinogen Urine 0.2 0.2 - 1.0 EU/dL    Nitrite Urine Negative NEG^Negative    Leukocyte Esterase Urine Negative NEG^Negative    Source Midstream Urine    Urine Microscopic   Result Value Ref [facility]    WBC Urine 0 - 5 OTO5^0 - 5 /HPF    RBC Urine O - 2 OTO2^O - 2 /HPF   .  Recent Results (from the past 48 hour(s))   XR Abdomen 2 Views    Narrative    XR ABDOMEN 2 VW 6/4/2019 12:15 PM    HISTORY: Change in consistency of stool.    COMPARISON: None.    FINDINGS: No evidence of free intraperitoneal air. Moderate stool in  the colon. The gas pattern is nonobstructive.      Impression    IMPRESSION: Normal abdomen.    MICHELLE MCCOLLUM MD       ASSESSMENT/PLAN:    ASSESSMENT / PLAN:  (Z87.448) History of changes urine output changes  (primary encounter diagnosis)  Comment: his reported changes may be wnl, but given h/o kidney stones, will check renal panel and refer to urology  Plan: *UA reflex to Microscopic and Culture ([facility]         and [facility] (except [facility]), UROLOGY ADULT REFERRAL, Urine         Microscopic, Renal panel        I reviewed signs of concern.  Follow up with urology within 2 week(s) or sooner if worsens in any way.  Reviewed red flag symptoms and is to go to the ER if experiences any of these.  Also advised him to have the bump on his scrotum examined when he sees the urologist as he does not want it discussed or examined today.    (R19.5) Change in consistency of stool  Comment: he may have IBS, or may have some chronic constipation with stool  softening to go around firmer stool.  Will have him start fiber daily which he will get otc fiber such as fibercon, metamucil, or Citrucel.    Plan: XR Abdomen 2 Views        Reviewed medication instructions and side effects. Follow up if experiences side effects.. I reviewed supportive care, otc meds to use if needed, expected course, and signs of concern.  Follow up with pcp if he does not improve within 2 week(s) or if worsens in any way.  Reviewed red flag symptoms and is to go to the ER if experiences any of these.      See [facility] for orders, medications, letters, patient instructions    Megan Randhawa M.D.

## 2019-06-28 ENCOUNTER — OFFICE VISIT (OUTPATIENT)
Dept: UROLOGY | Facility: CLINIC | Age: 34
End: 2019-06-28
Payer: COMMERCIAL

## 2019-06-28 VITALS — DIASTOLIC BLOOD PRESSURE: 81 MMHG | OXYGEN SATURATION: 97 % | SYSTOLIC BLOOD PRESSURE: 132 MMHG | HEART RATE: 88 BPM

## 2019-06-28 DIAGNOSIS — Z87.442 HISTORY OF RENAL STONE: Primary | ICD-10-CM

## 2019-06-28 DIAGNOSIS — R19.7 DIARRHEA, UNSPECIFIED TYPE: ICD-10-CM

## 2019-06-28 PROCEDURE — 99243 OFF/OP CNSLTJ NEW/EST LOW 30: CPT | Performed by: UROLOGY

## 2019-06-28 NOTE — PROGRESS NOTES
S: Patient is a pleasant 34-year-old  male who is request be seen by Dr. Megan jama for a consultation with regard to patient's history of renal stones.  Most recent renal colic was several weeks ago.  His recent KUB was unremarkable.  He has no voiding complaints.  He urinates about 3 times a day.  Recent creatinine was normal.  His main concern is diarrhea for last 6 months.  He is been taking Metamucil and some fibers without any improvements.  No current outpatient medications on file.     No Known Allergies  No past medical history on file.  No past surgical history on file.   Family History   Problem Relation Age of Onset     Asthma Mother      Asthma Father      C.A.D. Father      Hypertension Father      Cerebrovascular Disease Father      Asthma Sister      Diabetes No family hx of      Breast Cancer No family hx of      Cancer - colorectal No family hx of      Prostate Cancer No family hx of      Social History     Socioeconomic History     Marital status:      Spouse name: None     Number of children: None     Years of education: None     Highest education level: None   Occupational History     None   Social Needs     Financial resource strain: None     Food insecurity:     Worry: None     Inability: None     Transportation needs:     Medical: None     Non-medical: None   Tobacco Use     Smoking status: Current Every Day Smoker     Packs/day: 0.75     Types: Cigarettes     Smokeless tobacco: Former User   Substance and Sexual Activity     Alcohol use: No     Drug use: No     Sexual activity: Yes     Partners: Female   Lifestyle     Physical activity:     Days per week: None     Minutes per session: None     Stress: None   Relationships     Social connections:     Talks on phone: None     Gets together: None     Attends Denominational service: None     Active member of club or organization: None     Attends meetings of clubs or organizations: None     Relationship status: None     Intimate  partner violence:     Fear of current or ex partner: None     Emotionally abused: None     Physically abused: None     Forced sexual activity: None   Other Topics Concern     Parent/sibling w/ CABG, MI or angioplasty before 65F 55M? Not Asked   Social History Narrative     None       REVIEW OF SYSTEMS  =================  C: NEGATIVE for fever, chills, change in weight  I: NEGATIVE for worrisome rashes, moles or lesions  E/M: NEGATIVE for ear, mouth and throat problems  R: NEGATIVE for significant cough or SHORTNESS OF BREATH  CV:  NEGATIVE for chest pain, palpitations or peripheral edema  GI: NEGATIVE for nausea, abdominal pain, heartburn, or change in bowel habits  NEURO: NEGATIVE numbness/weakness  : see HPI  PSYCH: NEGATIVE depression/anxiety  LYmph: no new enlarged lymph nodes  Ortho: no new trauma/movements      Physical Exam:  /81 (BP Location: Right arm, Patient Position: Chair, Cuff Size: Adult Large)   Pulse 88   SpO2 97%    Patient is pleasant, in no acute distress, good general condition.  Heart:  negative, PMI normal  Lung: no evidence of respiratory distress    Abdomen: Soft, nondistended, non tender. No masses. No rebound or guarding.   Exam: No CVA tenderness  Skin: Warm and dry.  No redness.  Neuro: grossly normal  Musculaskeletal: moving all extremities  Psych normal mood and affect  Musculoskeletal  moving all extremities  Hematologic/Lymphatic/Immunologic: normal ant/post cervical, axillary, supraclavicular and inguinal nodes    Assessment/Plan:   Pleasant 34-year-old male with history of renal stones.  KUB recently unremarkable.  No urinary problems.  We will send him a referral to see GI for chronic diarrhea.  I discussed urorisks testing with him for recurrent renal stones.

## 2019-07-23 ENCOUNTER — OFFICE VISIT (OUTPATIENT)
Dept: GASTROENTEROLOGY | Facility: CLINIC | Age: 34
End: 2019-07-23
Attending: UROLOGY
Payer: COMMERCIAL

## 2019-07-23 VITALS
OXYGEN SATURATION: 99 % | HEART RATE: 65 BPM | SYSTOLIC BLOOD PRESSURE: 125 MMHG | WEIGHT: 203.1 LBS | BODY MASS INDEX: 28.43 KG/M2 | HEIGHT: 71 IN | DIASTOLIC BLOOD PRESSURE: 79 MMHG

## 2019-07-23 DIAGNOSIS — R10.12 LUQ ABDOMINAL PAIN: ICD-10-CM

## 2019-07-23 DIAGNOSIS — R19.7 DIARRHEA, UNSPECIFIED TYPE: Primary | ICD-10-CM

## 2019-07-23 LAB
ALBUMIN SERPL-MCNC: 4.2 G/DL (ref 3.4–5)
ALP SERPL-CCNC: 77 U/L (ref 40–150)
ALT SERPL W P-5'-P-CCNC: 39 U/L (ref 0–70)
ANION GAP SERPL CALCULATED.3IONS-SCNC: 4 MMOL/L (ref 3–14)
AST SERPL W P-5'-P-CCNC: 18 U/L (ref 0–45)
BASOPHILS # BLD AUTO: 0.1 10E9/L (ref 0–0.2)
BASOPHILS NFR BLD AUTO: 0.8 %
BILIRUB SERPL-MCNC: 0.5 MG/DL (ref 0.2–1.3)
BUN SERPL-MCNC: 18 MG/DL (ref 7–30)
CALCIUM SERPL-MCNC: 8.8 MG/DL (ref 8.5–10.1)
CHLORIDE SERPL-SCNC: 109 MMOL/L (ref 94–109)
CO2 SERPL-SCNC: 28 MMOL/L (ref 20–32)
CREAT SERPL-MCNC: 0.88 MG/DL (ref 0.66–1.25)
CRP SERPL-MCNC: <2.9 MG/L (ref 0–8)
DIFFERENTIAL METHOD BLD: NORMAL
EOSINOPHIL # BLD AUTO: 0.1 10E9/L (ref 0–0.7)
EOSINOPHIL NFR BLD AUTO: 1.5 %
ERYTHROCYTE [DISTWIDTH] IN BLOOD BY AUTOMATED COUNT: 12.4 % (ref 10–15)
GFR SERPL CREATININE-BSD FRML MDRD: >90 ML/MIN/{1.73_M2}
GLUCOSE SERPL-MCNC: 94 MG/DL (ref 70–99)
HCT VFR BLD AUTO: 47.8 % (ref 40–53)
HGB BLD-MCNC: 16.3 G/DL (ref 13.3–17.7)
IMM GRANULOCYTES # BLD: 0.1 10E9/L (ref 0–0.4)
IMM GRANULOCYTES NFR BLD: 0.7 %
LYMPHOCYTES # BLD AUTO: 2.3 10E9/L (ref 0.8–5.3)
LYMPHOCYTES NFR BLD AUTO: 27.9 %
MCH RBC QN AUTO: 30.6 PG (ref 26.5–33)
MCHC RBC AUTO-ENTMCNC: 34.1 G/DL (ref 31.5–36.5)
MCV RBC AUTO: 90 FL (ref 78–100)
MONOCYTES # BLD AUTO: 0.3 10E9/L (ref 0–1.3)
MONOCYTES NFR BLD AUTO: 4.1 %
NEUTROPHILS # BLD AUTO: 5.5 10E9/L (ref 1.6–8.3)
NEUTROPHILS NFR BLD AUTO: 65 %
PLATELET # BLD AUTO: 224 10E9/L (ref 150–450)
POTASSIUM SERPL-SCNC: 3.9 MMOL/L (ref 3.4–5.3)
PROT SERPL-MCNC: 7.5 G/DL (ref 6.8–8.8)
RBC # BLD AUTO: 5.33 10E12/L (ref 4.4–5.9)
SODIUM SERPL-SCNC: 141 MMOL/L (ref 133–144)
TSH SERPL DL<=0.005 MIU/L-ACNC: 1.28 MU/L (ref 0.4–4)
WBC # BLD AUTO: 8.4 10E9/L (ref 4–11)

## 2019-07-23 PROCEDURE — 85025 COMPLETE CBC W/AUTO DIFF WBC: CPT | Performed by: PHYSICIAN ASSISTANT

## 2019-07-23 PROCEDURE — 82784 ASSAY IGA/IGD/IGG/IGM EACH: CPT | Performed by: PHYSICIAN ASSISTANT

## 2019-07-23 PROCEDURE — 80053 COMPREHEN METABOLIC PANEL: CPT | Performed by: PHYSICIAN ASSISTANT

## 2019-07-23 PROCEDURE — 84443 ASSAY THYROID STIM HORMONE: CPT | Performed by: PHYSICIAN ASSISTANT

## 2019-07-23 PROCEDURE — 99214 OFFICE O/P EST MOD 30 MIN: CPT | Performed by: PHYSICIAN ASSISTANT

## 2019-07-23 PROCEDURE — 86140 C-REACTIVE PROTEIN: CPT | Performed by: PHYSICIAN ASSISTANT

## 2019-07-23 PROCEDURE — 83516 IMMUNOASSAY NONANTIBODY: CPT | Mod: 59 | Performed by: PHYSICIAN ASSISTANT

## 2019-07-23 PROCEDURE — 36415 COLL VENOUS BLD VENIPUNCTURE: CPT | Performed by: PHYSICIAN ASSISTANT

## 2019-07-23 PROCEDURE — 83516 IMMUNOASSAY NONANTIBODY: CPT | Performed by: PHYSICIAN ASSISTANT

## 2019-07-23 ASSESSMENT — MIFFLIN-ST. JEOR: SCORE: 1888.39

## 2019-07-23 ASSESSMENT — PAIN SCALES - GENERAL: PAINLEVEL: MODERATE PAIN (5)

## 2019-07-23 NOTE — NURSING NOTE
"Neftali Chan's goals for this visit include:   Chief Complaint   Patient presents with     Consult     Diarrhea since January, 2019; Abdominal pain; Patient denies blood in stool       He requests these members of his care team be copied on today's visit information: Patient states he does not have a PCP, primary clinic is [facility]    PCP: Clinic, [facility]    Referring Provider:  Richard Deng MD  [address] [facility] [place] [address]    /79 (BP Location: Left arm, Patient Position: Sitting, Cuff Size: Adult Regular)   Pulse 65   Ht 1.803 m (5' 11\")   Wt 92.1 kg (203 lb 1.6 oz)   SpO2 99%   BMI 28.33 kg/m      Do you need any medication refills at today's visit? No    Mallory Cedeño LPN      "

## 2019-07-23 NOTE — PATIENT INSTRUCTIONS
Blood work today.   Submit stool samples when completed to any [place].     Follow up after blood work and stool samples.

## 2019-07-23 NOTE — PROGRESS NOTES
GASTROENTEROLOGY NEW PATIENT CLINIC VISIT    CC/REFERRING MD:    [facility], [facility]  Richard Deng    REASON FOR CONSULTATION:   Referred by Richard Deng for Consult (Diarrhea since January, 2019; Abdominal pain; Patient denies blood in stool;)      HISTORY OF PRESENT ILLNESS:    Neftali Chan is 34 year old male who presents for evaluation of diarrhea for the past 6 months. Symptoms first started after he changed his diet to low carb, high protein and he stopped drinking sodas and began drinking more water. He initially thought symptoms were related to this diet change and expected improvement after several weeks, but have now persisted. He initially lost weight with change in diet but regained all the weight and is back to his baseline. He denies any prior abx or sick contacts. He did travel to [place] however this was after symptoms had already commenced. He was initially having 4-5 loose stools daily that he describes as bristol stool 7. After supplementation with fiber he notes that his stools are now 1-2 lose and mushy stools, bristol 6. He does not feel that he is completely emptying out. At times he may have to sit on the commode for an hour for stools to pass. He has tried miralax and colon cleansers without much relief or improvement in his symptoms. He does have abdominal cramping which can continue even after having a BM. Symptoms will sometimes wake up him up in the middle of the night, this includes cramping and nighttime stools. Denies rectal pain. No rectal bleeding or black tarry stools.     He denies any NSAID use.     There is no known family hx of IBD.       PROBLEM LIST  Patient Active Problem List    Diagnosis Date Noted     Tobacco abuse 04/16/2014     Priority: Medium     Bipolar 2 disorder (H) 03/12/2014     Priority: Medium     Anxiety 03/12/2014     Priority: Medium       PERTINENT PAST MEDICAL HISTORY:  (I personally reviewed this history with the patient at  today's visit)   Past Medical History:   Diagnosis Date     History of kidney stones          PREVIOUS SURGERIES: (I personally reviewed this history with the patient at today's visit)   Past Surgical History:   Procedure Laterality Date     NO HISTORY OF SURGERY       ALLERGIES:   No Known Allergies    PERTINENT MEDICATIONS:  No current outpatient medications on file.    SOCIAL HISTORY:  Social History     Socioeconomic History     Marital status:      Spouse name: Not on file     Number of children: Not on file     Years of education: Not on file     Highest education level: Not on file   Occupational History     Not on file   Social Needs     Financial resource strain: Not on file     Food insecurity:     Worry: Not on file     Inability: Not on file     Transportation needs:     Medical: Not on file     Non-medical: Not on file   Tobacco Use     Smoking status: Current Every Day Smoker     Packs/day: 0.75     Types: Cigarettes     Smokeless tobacco: Former User   Substance and Sexual Activity     Alcohol use: No     Drug use: No     Sexual activity: Yes     Partners: Female   Lifestyle     Physical activity:     Days per week: Not on file     Minutes per session: Not on file     Stress: Not on file   Relationships     Social connections:     Talks on phone: Not on file     Gets together: Not on file     Attends Bahai service: Not on file     Active member of club or organization: Not on file     Attends meetings of clubs or organizations: Not on file     Relationship status: Not on file     Intimate partner violence:     Fear of current or ex partner: Not on file     Emotionally abused: Not on file     Physically abused: Not on file     Forced sexual activity: Not on file   Other Topics Concern     Parent/sibling w/ CABG, MI or angioplasty before 65F 55M? Not Asked   Social History Narrative     Not on file       FAMILY HISTORY: (I personally reviewed this history with the patient at today's  "visit)  Family History   Problem Relation Age of Onset     Asthma Mother      Asthma Father      C.A.D. Father      Hypertension Father      Cerebrovascular Disease Father      Asthma Sister      Diabetes No family hx of      Breast Cancer No family hx of      Cancer - colorectal No family hx of      Prostate Cancer No family hx of         ROS:    No fevers or chills  No weight loss  No blurry vision, double vision or change in vision  No sore throat  No lymphadenopathy  No headache, paraesthesias, or weakness in a limb  No shortness of breath or wheezing  No chest pain or pressure  No arthralgias or myalgias  No rashes or skin changes  No odynophagia or dysphagia  No BRBPR, hematochezia, melena  No dysuria, frequency or urgency  No hot/cold intolerance or polyria  No anxiety or depression  PHYSICAL EXAMINATION:  Constitutional: aaox3, cooperative, pleasant, not dyspneic/diaphoretic, no acute distress  Vitals reviewed: /79 (BP Location: Left arm, Patient Position: Sitting, Cuff Size: Adult Regular)   Pulse 65   Ht 1.803 m (5' 11\")   Wt 92.1 kg (203 lb 1.6 oz)   SpO2 99%   BMI 28.33 kg/m     Wt:   Wt Readings from Last 2 Encounters:   07/23/19 92.1 kg (203 lb 1.6 oz)   06/04/19 90.7 kg (200 lb)          Eyes: Sclera anicteric/injected  Ears/nose/mouth/throat: Normal oropharynx without ulcers or exudate, mucus membranes moist, hearing intact  Neck: supple, thyroid normal size  CV: No edema, RRR  Respiratory: Unlabored breathing, CTAB  Lymph: No submandibular, supraclavicular or inguinal lymphadenopathy  Abd: Nondistended, no masses, +bs, no hepatosplenomegaly, nontender, no peritoneal signs  Skin: warm, perfused, no jaundice  Psych: Normal affect  MSK: Normal gait      RADIOLOGY   XR ABDOMEN 2 VW 6/4/2019 12:15 PM     HISTORY: Change in consistency of stool.     COMPARISON: None.     FINDINGS: No evidence of free intraperitoneal air. Moderate stool in  the colon. The gas pattern is nonobstructive.           "                                                            IMPRESSION: Normal abdomen.     MICHELLE MCCOLLUM MD        ASSESSMENT/PLAN:    Neftali Chan is a 34 year old male who presents for evaluation of chronic diarrhea. Symptoms first started over 6 months ago with change in his diet however symptoms have persisted. Recommended we check stool studies to r/o infectious etiology to his diarrhea. Will check inflammatory markers as well including crp and fecal calprotectin. If fecal calprotectin is elevated we should proceed with a colonoscopy.Will also check labs to include cbc, cmp, tsh and celiac serology.  If celiac serology is positive we should proceed with an upper endoscopy with small bowel biopsy.   May need to consider upper endoscopy and colonoscopy regardless if no reason for diarrhea or pain found via blood work and stool studies. His previous work up has included an abdominal xray which actually showed moderate stool in the colon. It is possible that his diarrhea may be overflow. He however has tried miralax and other otc remedies without any change in symptoms. Continue fiber supplementation at this time. If work up negative, consider another trial of miralax daily.     Further recommendations after above mentioned work up.     Diarrhea, unspecified type  LUQ abdominal pain      Orders Placed This Encounter   Procedures     CRP inflammation     Standing Status:   Future     Number of Occurrences:   1     Standing Expiration Date:   8/22/2019     CBC with platelets differential     Standing Status:   Future     Number of Occurrences:   1     Standing Expiration Date:   8/22/2019     Comprehensive metabolic panel     Standing Status:   Future     Number of Occurrences:   1     Standing Expiration Date:   8/22/2019     Tissue transglutaminase sun IgA and IgG     Standing Status:   Future     Number of Occurrences:   1     Standing Expiration Date:   8/22/2019     IgA     Standing Status:   Future      Number of Occurrences:   1     Standing Expiration Date:   8/22/2019     TSH with free T4 reflex     Last Lab Result: No results found for: TSH     Standing Status:   Future     Number of Occurrences:   1     Standing Expiration Date:   8/22/2019     Calprotectin Feces     Standing Status:   Future     Standing Expiration Date:   7/22/2020     Giardia antigen     Standing Status:   Future     Standing Expiration Date:   7/22/2020     Clostridium difficile toxin B PCR     Standing Status:   Future     Standing Expiration Date:   8/22/2019     Enteric Bacteria and Virus Panel by MARIELA Stool     Standing Status:   Future     Standing Expiration Date:   7/22/2020     Ova and Parasite Exam Routine     Standing Status:   Future     Standing Expiration Date:   7/22/2020       Thank you for this consultation.  It was a pleasure to participate in the care of this patient; please contact us with any further questions.      This note was created with voice recognition software, and while reviewed for accuracy, typos may remain.     Laisha Li PA-C  Gastroenterology  [facility]

## 2019-07-24 LAB
IGA SERPL-MCNC: 307 MG/DL (ref 70–380)
TTG IGA SER-ACNC: 1 U/ML
TTG IGG SER-ACNC: <1 U/ML

## 2019-10-03 ENCOUNTER — OFFICE VISIT (OUTPATIENT)
Dept: URGENT CARE | Facility: URGENT CARE | Age: 34
End: 2019-10-03
Payer: MEDICAID

## 2019-10-03 VITALS — HEART RATE: 100 BPM | DIASTOLIC BLOOD PRESSURE: 87 MMHG | SYSTOLIC BLOOD PRESSURE: 121 MMHG | OXYGEN SATURATION: 100 %

## 2019-10-03 DIAGNOSIS — F19.929: Primary | ICD-10-CM

## 2019-10-03 PROCEDURE — 99213 OFFICE O/P EST LOW 20 MIN: CPT | Performed by: FAMILY MEDICINE

## 2019-10-03 RX ORDER — QUETIAPINE FUMARATE 200 MG/1
200 TABLET, FILM COATED ORAL 2 TIMES DAILY
COMMUNITY
End: 2019-10-13

## 2019-10-03 RX ORDER — BUPROPION HYDROCHLORIDE 150 MG/1
150 TABLET ORAL 2 TIMES DAILY
COMMUNITY
End: 2020-01-23

## 2019-10-03 RX ORDER — CLONAZEPAM 1 MG/1
1 TABLET ORAL 2 TIMES DAILY PRN
COMMUNITY
End: 2020-01-23

## 2019-10-04 NOTE — PROGRESS NOTES
"SUBJECTIVE:   Neftali Chan is a 34 year old male presenting with a chief complaint of   Chief Complaint   Patient presents with     Drug Overdose     took hydrocodone 2 days ago, feels numb and like he is intoxicated, 1 tablet was oval with IP 09 imprint another tablet was whitish blue and round      Woke today feeling tired and sleeping most of the day. Apparently while at a party was given tablets of \"vicodin\" and may have had 6-8 tablets throughout the night. Denies other drug use or THC use. Was drinking alcohol last evening as well.       Hx of sleep apnea    Bipolar and clinical depression   -- medication was changed a week ago -- wellbutrin. Stopped lamotrigine     lomotrigine last week.   Klonopin 2 times per week.   Wellbutrin over the past week as a new medication ---     Review of Systems   Constitutional: Negative for chills, diaphoresis and fever.   HENT: Negative for congestion, ear pain, rhinorrhea and sore throat.    Respiratory: Negative for cough and shortness of breath.    Gastrointestinal: Negative for diarrhea, nausea and vomiting.   Psychiatric/Behavioral: Negative for suicidal ideas. The patient is not nervous/anxious.          Patient Active Problem List   Diagnosis     Bipolar 2 disorder (H)     Anxiety     Tobacco abuse      Past Medical History:   Diagnosis Date     History of kidney stones      Family History   Problem Relation Age of Onset     Asthma Mother      Asthma Father      C.A.D. Father      Hypertension Father      Cerebrovascular Disease Father      Asthma Sister      Diabetes No family hx of      Breast Cancer No family hx of      Cancer - colorectal No family hx of      Prostate Cancer No family hx of      Current Outpatient Medications   Medication Sig Dispense Refill     buPROPion (WELLBUTRIN XL) 150 MG 24 hr tablet Take 150 mg by mouth 2 times daily       clonazePAM (KLONOPIN) 1 MG tablet Take 1 mg by mouth 2 times daily as needed for anxiety       QUEtiapine " (SEROQUEL) 200 MG tablet Take 200 mg by mouth 2 times daily       Social History     Tobacco Use     Smoking status: Current Every Day Smoker     Packs/day: 0.75     Types: Cigarettes     Smokeless tobacco: Former User   Substance Use Topics     Alcohol use: No       OBJECTIVE  /87   Pulse 100   SpO2 100%     Physical Exam  HENT:      Head: Normocephalic and atraumatic.      Right Ear: External ear normal.      Left Ear: External ear normal.      Nose: Nose normal.      Mouth/Throat:      Pharynx: No oropharyngeal exudate.   Eyes:      General: No scleral icterus.        Right eye: No discharge.         Left eye: No discharge.      Conjunctiva/sclera: Conjunctivae normal.      Pupils: Pupils are equal, round, and reactive to light.   Neck:      Musculoskeletal: Neck supple.      Thyroid: No thyromegaly.      Trachea: No tracheal deviation.   Cardiovascular:      Rate and Rhythm: Normal rate and regular rhythm.      Heart sounds: Normal heart sounds. No murmur. No friction rub. No gallop.    Pulmonary:      Effort: Pulmonary effort is normal. No respiratory distress.      Breath sounds: Normal breath sounds. No stridor. No wheezing or rales.   Chest:      Chest wall: No tenderness.   Abdominal:      General: Bowel sounds are normal. There is no distension.      Palpations: Abdomen is soft. There is no mass.      Tenderness: There is no tenderness. There is no guarding or rebound.   Musculoskeletal:         General: No tenderness or deformity.   Lymphadenopathy:      Cervical: No cervical adenopathy.   Skin:     General: Skin is warm and dry.      Capillary Refill: Capillary refill takes less than 2 seconds.      Findings: No erythema or rash.   Neurological:      Mental Status: He is alert and oriented to person, place, and time.      Cranial Nerves: No cranial nerve deficit.      Sensory: No sensory deficit.   Psychiatric:         Judgement: Judgment normal.         ASSESSMENT:    ICD-10-CM    1. Drug  intoxication, with unspecified complication (H) F15.522         PLAN:  Improving and exam reassuring without current intoxication.   Emphasized the importance of mixing alcohol and opiate medicines.   Also emphasized not accepting unknown substances from others.   Patient educational/instructional material provided including reasons for follow-up   Silvio Reis MD

## 2019-10-08 ASSESSMENT — ENCOUNTER SYMPTOMS
CHILLS: 0
RHINORRHEA: 0
COUGH: 0
VOMITING: 0
DIARRHEA: 0
FEVER: 0
SHORTNESS OF BREATH: 0
NERVOUS/ANXIOUS: 0
DIAPHORESIS: 0
SORE THROAT: 0
NAUSEA: 0

## 2019-10-13 ENCOUNTER — OFFICE VISIT (OUTPATIENT)
Dept: URGENT CARE | Facility: URGENT CARE | Age: 34
End: 2019-10-13
Payer: MEDICAID

## 2019-10-13 ENCOUNTER — NURSE TRIAGE (OUTPATIENT)
Dept: NURSING | Facility: CLINIC | Age: 34
End: 2019-10-13

## 2019-10-13 VITALS
OXYGEN SATURATION: 99 % | HEIGHT: 71 IN | BODY MASS INDEX: 28.76 KG/M2 | DIASTOLIC BLOOD PRESSURE: 84 MMHG | SYSTOLIC BLOOD PRESSURE: 145 MMHG | RESPIRATION RATE: 18 BRPM | HEART RATE: 79 BPM | TEMPERATURE: 98.2 F | WEIGHT: 205.4 LBS

## 2019-10-13 DIAGNOSIS — F31.9 BIPOLAR I DISORDER (H): ICD-10-CM

## 2019-10-13 DIAGNOSIS — G47.09 OTHER INSOMNIA: Primary | ICD-10-CM

## 2019-10-13 PROCEDURE — 99214 OFFICE O/P EST MOD 30 MIN: CPT | Performed by: INTERNAL MEDICINE

## 2019-10-13 RX ORDER — ALPRAZOLAM 0.5 MG
TABLET ORAL
COMMUNITY
Start: 2019-09-12 | End: 2020-01-23 | Stop reason: DRUGHIGH

## 2019-10-13 RX ORDER — ESZOPICLONE 2 MG/1
2 TABLET, FILM COATED ORAL
Qty: 5 TABLET | Refills: 0 | Status: SHIPPED | OUTPATIENT
Start: 2019-10-13 | End: 2020-01-23

## 2019-10-13 ASSESSMENT — MIFFLIN-ST. JEOR: SCORE: 1898.82

## 2019-10-13 NOTE — PROGRESS NOTES
"SUBJECTIVE:   Neftali Chan is a 34 year old male presenting with a chief complaint of   Chief Complaint   Patient presents with     Insomnia     has not slept in 4 day        He is an established patient of [facility].      Difficulty with insomnia  Poor sleep past 2 weeks.  3-4 hours.  Drives heavy equipment.  Concerned about poor sleep & operation errors    Seroquel treatment for bipolar disorder - had side effects so providers stopped medication.  Also instructed to stop wellbutrin as told could affect sleep few weeks ago, this did not help  Pedro Hickman, referred to PCP for sleep meds  In past triedTrazodone - helped fall asleep, but not stay asleep.    Requesting Lunesta - helped previously  Covered by insurance        Review of Systems    Past Medical History:   Diagnosis Date     History of kidney stones      Family History   Problem Relation Age of Onset     Asthma Mother      Asthma Father      C.A.D. Father      Hypertension Father      Cerebrovascular Disease Father      Asthma Sister      Diabetes No family hx of      Breast Cancer No family hx of      Cancer - colorectal No family hx of      Prostate Cancer No family hx of      Current Outpatient Medications   Medication Sig Dispense Refill     ALPRAZolam (XANAX) 0.5 MG tablet        clonazePAM (KLONOPIN) 1 MG tablet Take 1 mg by mouth 2 times daily as needed for anxiety       eszopiclone (LUNESTA) 2 MG tablet Take 1 tablet (2 mg) by mouth nightly as needed for sleep 5 tablet 0     buPROPion (WELLBUTRIN XL) 150 MG 24 hr tablet Take 150 mg by mouth 2 times daily       Social History     Tobacco Use     Smoking status: Current Every Day Smoker     Packs/day: 0.75     Types: Cigarettes     Smokeless tobacco: Former User   Substance Use Topics     Alcohol use: No       OBJECTIVE  BP (!) 145/84   Pulse 79   Temp 98.2  F (36.8  C) (Oral)   Resp 18   Ht 1.803 m (5' 11\")   Wt 93.2 kg (205 lb 6.4 oz)   SpO2 99%   BMI 28.65 kg/m      Physical " Exam  Vitals signs reviewed.   Constitutional:       Appearance: Normal appearance.   Neurological:      Mental Status: He is alert.   Psychiatric:         Mood and Affect: Mood normal.         Behavior: Behavior normal.         Thought Content: Thought content normal.         Judgement: Judgment normal.      Comments: neva         Labs:  No results found for this or any previous visit (from the past 24 hour(s)).        ASSESSMENT:      ICD-10-CM    1. Other insomnia G47.09 eszopiclone (LUNESTA) 2 MG tablet   2. Bipolar I disorder (H) F31.9         Medical Decision Making:  Neva gentleman,   Off bipolar meds for potential side effects with earliest recheck available 1 month  Will dispense lunestsa 2 mg - 5 tablets to help with sleep while making appointment to establish care.  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  Discussed urgent care is not clinic that normally dispenses meds for insomnia  Hopefully medication regimen form psychiatrist will also help sleep  Discussed sleep hygiene/ handout given  Discussed avoid benzodiazapene due to addiction potential    PLAN:        Patient Instructions     lunesta 2 mg at night as needed. 5 tablets given  Supplement with melatonin 5- 10 mg at night.    Follow up mood disorder, Pedro - has appointment next month  Call & discuss nurse.    establish care primary.      Patient Education     Insomnia  Insomnia is repeated difficulty going to sleep or staying asleep, or both. Whether you have insomnia is not defined by a specific amount of sleep. Different people need different amounts of sleep, and you may need more or less sleep at different times of your life.  There are 3 major types of insomnia: short-term, chronic, and  other.  Short-term, or acute insomnia lasts less than 3 months. The symptoms are temporary and can be linked directly to a stressor, such as the death of a loved one, financial problems, or a new physical problem. Short-term insomnia  stops when the stressor resolves or the person adapts to its presence.  Chronic insomnia occurs at least 3 times a week and lasts longer than 3 months. Chronic insomnia can occur when either the cause of the sleeping problem is not clear, or the insomnia does not get better when the stressor is resolved. A number of other criteria are also used to make the diagnosis of chronic insomnia.    Other insomnia  is the third type of insomnia-related sleep disorders. This description applies to people who have problems getting to sleep or staying asleep, but don't meet all of the factors that describe either short-term or chronic insomnia.    Many things cause insomnia. Different people may have different causes. It can be from an underlying medical or psychological condition, or lifestyle. It can also be primary insomnia, which means no cause can be found.  Causes of insomnia include:    Chronic medical problems- heart disease, gastrointestinal problems, hormonal changes, breathing problems    Anxiety    Stress    Depression    Pain    Work schedule    Sleep apnea    Illegal drugs    Certain medicines  Many different medicines can affect your sleep, such as stimulants, caffeine, alcohol, some decongestants, and diet pills. Other medicines may include some types of blood pressure pills, steroids, asthma medicines, antihistamines, antidepressants, seizure medicines and statins. Not all of these will affect your sleep, and they shouldn t be stopped without talking to your doctor.  Symptoms of insomnia can include:    Lying awake for long periods at night before falling asleep    Waking up several times during the night    Waking up early in the morning and not being able to get back to sleep    Feeling tired and not refreshed by sleep    Not being able to function properly during the day and finding it hard to concentrate    Irritability    Tiredness and fatigue during the day  Home care    Review your medicines with your  doctor or pharmacist to find out if they can cause insomnia. Not all medicines will affect your sleep, but they shouldn't be stopped without reviewing them with your doctor. There may be serious side effects and consequences from suddenly stopping your medicines. Not taking them may cause strokes, heart attacks, and many other problems.    Caffeine, smoking, and alcohol also affect sleep. Limit your daily use and don't use these before bedtime. Alcohol may make you sleepy at first, but as its effects wear off, you may awaken a few hours later and have trouble returning to sleep.    Don't exercise, eat or drink large amounts of liquid within 2 hours of your bedtime.    Improve your sleep habits. Have a fixed bed and wake-up time. Try to keep noise, light and heat in your bedroom at a comfortable level. Try using earplugs or eyeshades if needed.     Don't watch TV in bed.    If you don't fall asleep within 30 minutes, try to relax by reading or listening to soft music.    Limit daytime napping to one 30 minute period, early in the day.    Get regular exercise. Find other ways to lessen your stress level.    If a medicine was prescribed to help reset your sleep patterns, take it as directed. Sleeping pills are intended for short-term use, only. If taken for too long, the effect wears off while the risk of physical addiction and psychological dependence increases.  Sleep diary  If the cause isn t obvious and it is not improving, try keeping a  sleep diary  for a couple of weeks. Include in it:    The time you go to bed    How long it takes to fall asleep    How many times you wake up    What time you wake up    Your meal times and what you eat    What time you drink alcohol and caffeine    Your exercise habits and times  Follow-up care  Follow up with your healthcare provider, or as advised. If an X-ray or CT scan was done, you will be notified if there is a change in the reading, especially if it affects  treatment.  Call 911  Call 911 if any of these occur:    Trouble breathing    Confusion or trouble waking    Fainting or loss of consciousness    Rapid heart rate    New chest, arm, shoulder, neck or upper back pain    Trouble with speech or vision, weakness of an arm or leg    Trouble walking or talking, loss of balance, numbness or weakness in one side of your body, facial droop  When to seek medical advice  Call your healthcare provider right away if any of these occur:    Extreme restlessness or irritability    Confusion or hallucinations (seeing or hearing things that are not there)    Anxiety, depression    Several days without sleeping  Date Last Reviewed: 5/1/2018 2000-2018 [employer]. [address] [place] [address], [place] [address]. All rights reserved. This information is not intended as a substitute for professional medical care. Always follow your healthcare professional's instructions.

## 2019-10-13 NOTE — TELEPHONE ENCOUNTER
"Patient says he has had only 2-3 hours of sleep per night.  Patient says he has tried OTC meds and they have not been helpful. Patient wants to know if he should go to urgent care for a medication.  FNA advised he can be evaluated in  or go to clinic.  Patient will go to [facility] today since he works tomorrow.      Reason for Disposition    Requesting medication for sleep (\"sleeping pill\")    Additional Information    Negative: Difficulty breathing    Negative: Depression is suspected    Negative: Traumatic Brain Injury (TBI) is suspected    Negative: Alcohol  abuse or dependence is suspected    Negative: Substance abuse or dependence suspected    Negative: [1] Pain is causing insomnia AND [2] pain is not a chronic symptom (recurrent or ongoing AND present > 4 weeks)    Protocols used: INSOMNIA-A-AH      "

## 2019-10-13 NOTE — PATIENT INSTRUCTIONS
lunesta 2 mg at night as needed. 5 tablets given  Supplement with melatonin 5- 10 mg at night.    Follow up mood disorder, Pedro - has appointment next month  Call & discuss nurse.    establish care primary.      Patient Education     Insomnia  Insomnia is repeated difficulty going to sleep or staying asleep, or both. Whether you have insomnia is not defined by a specific amount of sleep. Different people need different amounts of sleep, and you may need more or less sleep at different times of your life.  There are 3 major types of insomnia: short-term, chronic, and  other.  Short-term, or acute insomnia lasts less than 3 months. The symptoms are temporary and can be linked directly to a stressor, such as the death of a loved one, financial problems, or a new physical problem. Short-term insomnia stops when the stressor resolves or the person adapts to its presence.  Chronic insomnia occurs at least 3 times a week and lasts longer than 3 months. Chronic insomnia can occur when either the cause of the sleeping problem is not clear, or the insomnia does not get better when the stressor is resolved. A number of other criteria are also used to make the diagnosis of chronic insomnia.    Other insomnia  is the third type of insomnia-related sleep disorders. This description applies to people who have problems getting to sleep or staying asleep, but don't meet all of the factors that describe either short-term or chronic insomnia.    Many things cause insomnia. Different people may have different causes. It can be from an underlying medical or psychological condition, or lifestyle. It can also be primary insomnia, which means no cause can be found.  Causes of insomnia include:    Chronic medical problems- heart disease, gastrointestinal problems, hormonal changes, breathing problems    Anxiety    Stress    Depression    Pain    Work schedule    Sleep apnea    Illegal drugs    Certain medicines  Many different  medicines can affect your sleep, such as stimulants, caffeine, alcohol, some decongestants, and diet pills. Other medicines may include some types of blood pressure pills, steroids, asthma medicines, antihistamines, antidepressants, seizure medicines and statins. Not all of these will affect your sleep, and they shouldn t be stopped without talking to your doctor.  Symptoms of insomnia can include:    Lying awake for long periods at night before falling asleep    Waking up several times during the night    Waking up early in the morning and not being able to get back to sleep    Feeling tired and not refreshed by sleep    Not being able to function properly during the day and finding it hard to concentrate    Irritability    Tiredness and fatigue during the day  Home care    Review your medicines with your doctor or pharmacist to find out if they can cause insomnia. Not all medicines will affect your sleep, but they shouldn't be stopped without reviewing them with your doctor. There may be serious side effects and consequences from suddenly stopping your medicines. Not taking them may cause strokes, heart attacks, and many other problems.    Caffeine, smoking, and alcohol also affect sleep. Limit your daily use and don't use these before bedtime. Alcohol may make you sleepy at first, but as its effects wear off, you may awaken a few hours later and have trouble returning to sleep.    Don't exercise, eat or drink large amounts of liquid within 2 hours of your bedtime.    Improve your sleep habits. Have a fixed bed and wake-up time. Try to keep noise, light and heat in your bedroom at a comfortable level. Try using earplugs or eyeshades if needed.     Don't watch TV in bed.    If you don't fall asleep within 30 minutes, try to relax by reading or listening to soft music.    Limit daytime napping to one 30 minute period, early in the day.    Get regular exercise. Find other ways to lessen your stress level.    If a  medicine was prescribed to help reset your sleep patterns, take it as directed. Sleeping pills are intended for short-term use, only. If taken for too long, the effect wears off while the risk of physical addiction and psychological dependence increases.  Sleep diary  If the cause isn t obvious and it is not improving, try keeping a  sleep diary  for a couple of weeks. Include in it:    The time you go to bed    How long it takes to fall asleep    How many times you wake up    What time you wake up    Your meal times and what you eat    What time you drink alcohol and caffeine    Your exercise habits and times  Follow-up care  Follow up with your healthcare provider, or as advised. If an X-ray or CT scan was done, you will be notified if there is a change in the reading, especially if it affects treatment.  Call 911  Call 911 if any of these occur:    Trouble breathing    Confusion or trouble waking    Fainting or loss of consciousness    Rapid heart rate    New chest, arm, shoulder, neck or upper back pain    Trouble with speech or vision, weakness of an arm or leg    Trouble walking or talking, loss of balance, numbness or weakness in one side of your body, facial droop  When to seek medical advice  Call your healthcare provider right away if any of these occur:    Extreme restlessness or irritability    Confusion or hallucinations (seeing or hearing things that are not there)    Anxiety, depression    Several days without sleeping  Date Last Reviewed: 5/1/2018 2000-2018 [employer]. [address] [place] [id number]. All rights reserved. This information is not intended as a substitute for professional medical care. Always follow your healthcare professional's instructions.

## 2019-11-06 ENCOUNTER — HEALTH MAINTENANCE LETTER (OUTPATIENT)
Age: 34
End: 2019-11-06

## 2020-01-23 ENCOUNTER — OFFICE VISIT (OUTPATIENT)
Dept: FAMILY MEDICINE | Facility: CLINIC | Age: 35
End: 2020-01-23
Payer: COMMERCIAL

## 2020-01-23 VITALS
WEIGHT: 211.4 LBS | HEART RATE: 80 BPM | DIASTOLIC BLOOD PRESSURE: 81 MMHG | TEMPERATURE: 97.1 F | HEIGHT: 71 IN | BODY MASS INDEX: 29.6 KG/M2 | SYSTOLIC BLOOD PRESSURE: 142 MMHG

## 2020-01-23 DIAGNOSIS — Z53.9 ERRONEOUS ENCOUNTER--DISREGARD: Primary | ICD-10-CM

## 2020-01-23 RX ORDER — ALPRAZOLAM 1 MG
1 TABLET ORAL DAILY
COMMUNITY
Start: 2020-01-15 | End: 2021-10-12

## 2020-01-23 ASSESSMENT — MIFFLIN-ST. JEOR: SCORE: 1926.03

## 2020-01-23 NOTE — PROGRESS NOTES
Pt was not in room when I went in to see him and never returned to the room.  Disregard encounter.

## 2020-02-16 ENCOUNTER — OFFICE VISIT (OUTPATIENT)
Dept: URGENT CARE | Facility: URGENT CARE | Age: 35
End: 2020-02-16
Payer: COMMERCIAL

## 2020-02-16 VITALS
TEMPERATURE: 98.1 F | DIASTOLIC BLOOD PRESSURE: 78 MMHG | HEART RATE: 69 BPM | OXYGEN SATURATION: 98 % | SYSTOLIC BLOOD PRESSURE: 149 MMHG

## 2020-02-16 DIAGNOSIS — Z20.818 STREPTOCOCCUS EXPOSURE: ICD-10-CM

## 2020-02-16 DIAGNOSIS — L30.9 DERMATITIS: ICD-10-CM

## 2020-02-16 DIAGNOSIS — K13.79 MOUTH SORE: ICD-10-CM

## 2020-02-16 DIAGNOSIS — J02.9 SORE THROAT: ICD-10-CM

## 2020-02-16 DIAGNOSIS — R52 BODY ACHES: Primary | ICD-10-CM

## 2020-02-16 LAB
ALBUMIN UR-MCNC: NEGATIVE MG/DL
APPEARANCE UR: CLEAR
BILIRUB UR QL STRIP: NEGATIVE
COLOR UR AUTO: YELLOW
DEPRECATED S PYO AG THROAT QL EIA: NORMAL
ERYTHROCYTE [DISTWIDTH] IN BLOOD BY AUTOMATED COUNT: 12.6 % (ref 10–15)
FLUAV+FLUBV AG SPEC QL: NEGATIVE
FLUAV+FLUBV AG SPEC QL: NEGATIVE
GLUCOSE BLD-MCNC: 79 MG/DL (ref 70–99)
GLUCOSE UR STRIP-MCNC: NEGATIVE MG/DL
HCT VFR BLD AUTO: 47.7 % (ref 40–53)
HGB BLD-MCNC: 16 G/DL (ref 13.3–17.7)
HGB UR QL STRIP: NEGATIVE
KETONES UR STRIP-MCNC: NEGATIVE MG/DL
KOH PREP SPEC: NORMAL
LEUKOCYTE ESTERASE UR QL STRIP: NEGATIVE
MCH RBC QN AUTO: 30.4 PG (ref 26.5–33)
MCHC RBC AUTO-ENTMCNC: 33.5 G/DL (ref 31.5–36.5)
MCV RBC AUTO: 91 FL (ref 78–100)
NITRATE UR QL: NEGATIVE
PH UR STRIP: 6 PH (ref 5–7)
PLATELET # BLD AUTO: 221 10E9/L (ref 150–450)
RBC # BLD AUTO: 5.27 10E12/L (ref 4.4–5.9)
SOURCE: NORMAL
SP GR UR STRIP: >1.03 (ref 1–1.03)
SPECIMEN SOURCE: NORMAL
UROBILINOGEN UR STRIP-ACNC: 0.2 EU/DL (ref 0.2–1)
WBC # BLD AUTO: 9.9 10E9/L (ref 4–11)

## 2020-02-16 PROCEDURE — 82947 ASSAY GLUCOSE BLOOD QUANT: CPT | Performed by: FAMILY MEDICINE

## 2020-02-16 PROCEDURE — 85027 COMPLETE CBC AUTOMATED: CPT | Performed by: FAMILY MEDICINE

## 2020-02-16 PROCEDURE — 87880 STREP A ASSAY W/OPTIC: CPT | Performed by: FAMILY MEDICINE

## 2020-02-16 PROCEDURE — 87081 CULTURE SCREEN ONLY: CPT | Performed by: FAMILY MEDICINE

## 2020-02-16 PROCEDURE — 99214 OFFICE O/P EST MOD 30 MIN: CPT | Performed by: FAMILY MEDICINE

## 2020-02-16 PROCEDURE — 81003 URINALYSIS AUTO W/O SCOPE: CPT | Performed by: FAMILY MEDICINE

## 2020-02-16 PROCEDURE — 87210 SMEAR WET MOUNT SALINE/INK: CPT | Performed by: FAMILY MEDICINE

## 2020-02-16 PROCEDURE — 87804 INFLUENZA ASSAY W/OPTIC: CPT | Performed by: FAMILY MEDICINE

## 2020-02-16 PROCEDURE — 36415 COLL VENOUS BLD VENIPUNCTURE: CPT | Performed by: FAMILY MEDICINE

## 2020-02-16 RX ORDER — CLOTRIMAZOLE 10 MG/1
10 LOZENGE ORAL
Qty: 50 TROCHE | Refills: 0 | Status: SHIPPED | OUTPATIENT
Start: 2020-02-16 | End: 2020-02-26

## 2020-02-16 RX ORDER — TRIAMCINOLONE ACETONIDE 1 MG/G
CREAM TOPICAL 2 TIMES DAILY
Qty: 80 G | Refills: 0 | Status: SHIPPED | OUTPATIENT
Start: 2020-02-16 | End: 2021-10-12

## 2020-02-16 NOTE — PROGRESS NOTES
Chief complaint: dry mouth concern for thrush     Accompanied by wife    Feels safe  No thoughts of harming self or others    Dry mouth for a couple of weeks    Sinus symptoms and sore throat for 3 days  White spots pas couple of days    No fevers   Chest Pain or shortness of breath: No  Orthopnea, Edema, PND: No  Cough, colds, or respiratory symptoms: Yes:   Abdominal Pain: No  Urinary symptoms or Flank Pain: No  Focal numbness or weakness: No  Rash: No  Fever or Chills: No  Weight loss: No  Poor Appetite: Yes: 3 days  History of Thyroid problems/thyroid medication compliance: No  Headache or Neck stiffness: No  Joint Pain or Arthralgias: no  Melena/Hematochezia/Hematemesis: No  Depression or Mood changes: No  No recent change in medications: No  Concern about recent tick-bite: No  Concern about recent travel/possible exposure: No    PATIENT ALSO COMPLAINS OF SCALY PLAQUES OR PATCHES OF SKIN HE GETS OFF AND ON DURING WINTER  USUALLY CLEARS WITH STEROID CREAMS AND PATIENT REQUESTING ONE  BROTHER HAS PSORIASIS     Problem list and histories reviewed & adjusted, as indicated.  Additional history: as documented    Problem list, Medication list, Allergies, and Medical/Social/Surgical histories reviewed in [facility] and updated as appropriate.    ROS:  Constitutional, HEENT, cardiovascular, pulmonary, GI, , musculoskeletal, neuro, skin, endocrine and psych systems are negative, except as otherwise noted.    OBJECTIVE:                                                    BP (!) 149/78   Pulse 69   Temp 98.1  F (36.7  C) (Tympanic)   SpO2 98%   There is no height or weight on file to calculate BMI.  GENERAL: healthy, alert and no distress  EYES: Eyes grossly normal to inspection, PERRL and conjunctivae and sclerae normal  HENT: ear canals and TM's normal, nose and mouth without ulcers or lesions  HENT: oral mucous membranes moist   Bilateral inner buccal mucosa whitish plaques areas where teeth rub together able to move with  qtip   NECK: no adenopathy, no asymmetry, masses, or scars and thyroid normal to palpation  RESP: lungs clear to auscultation - no rales, rhonchi or wheezes  CV: regular rate and rhythm, normal S1 S2, no S3 or S4, no murmur, click or rub, no peripheral edema and peripheral pulses strong  ABDOMEN: soft, nontender, no hepatosplenomegaly, no masses and bowel sounds normal  MS: no gross musculoskeletal defects noted, no edema  SKIN: no suspicious lesions or rashes  Scaly erythematous plaques over right knee and left posterior thigh   NEURO: Normal strength and tone, mentation intact and speech normal  PSYCH: mentation appears normal, affect normal/bright    Diagnostic Test Results:  Results for orders placed or performed in visit on 02/16/20 (from the past 24 hour(s))   KOH prep (Other than skin, nails, hair)   Result Value Ref [facility]    Specimen Description Mouth     KOH Prep No fungal elements seen    Influenza A/B antigen   Result Value Ref [facility]    Influenza A/B Agn Specimen Nasal     Influenza A Negative NEG^Negative    Influenza B Negative NEG^Negative   Rapid strep screen   Result Value Ref [facility]    Specimen Description Throat     Rapid Strep A Screen       NEGATIVE: No Group A streptococcal antigen detected by immunoassay, await culture report.   *UA reflex to Microscopic and Culture ([place] and [facility] (except [place] and [place])   Result Value Ref [facility]    Color Urine Yellow     Appearance Urine Clear     Glucose Urine Negative NEG^Negative mg/dL    Bilirubin Urine Negative NEG^Negative    Ketones Urine Negative NEG^Negative mg/dL    Specific Gravity Urine >1.030 1.003 - 1.035    Blood Urine Negative NEG^Negative    pH Urine 6.0 5.0 - 7.0 pH    Protein Albumin Urine Negative NEG^Negative mg/dL    Urobilinogen Urine 0.2 0.2 - 1.0 EU/dL    Nitrite Urine Negative NEG^Negative    Leukocyte Esterase Urine Negative NEG^Negative    Source Midstream Urine    CBC with platelets   Result Value Ref [facility]     WBC 9.9 4.0 - 11.0 10e9/L    RBC Count 5.27 4.4 - 5.9 10e12/L    Hemoglobin 16.0 13.3 - 17.7 g/dL    Hematocrit 47.7 40.0 - 53.0 %    MCV 91 78 - 100 fl    MCH 30.4 26.5 - 33.0 pg    MCHC 33.5 31.5 - 36.5 g/dL    RDW 12.6 10.0 - 15.0 %    Platelet Count 221 150 - 450 10e9/L   Glucose whole blood   Result Value Ref [facility]    Glucose Whole Blood 79 70 - 99 mg/dL        ASSESSMENT/PLAN:                                                        ICD-10-CM    1. Body aches R52 Influenza A/B antigen     Rapid strep screen     CBC with platelets     Glucose whole blood     JUST IN CASE     *UA reflex to Microscopic and Culture ([place] and [facility] (except [place] and [place])   2. Sore throat J02.9 Influenza A/B antigen     Rapid strep screen     CBC with platelets     Glucose whole blood     JUST IN CASE     *UA reflex to Microscopic and Culture ([facility] (except [place] and [place])     Beta strep group A culture   3. Mouth sore K13.79 KOH prep (Other than skin, nails, hair)     Influenza A/B antigen     Rapid strep screen     CBC with platelets     Glucose whole blood     JUST IN CASE     *UA reflex to Microscopic and Culture ([facility] (except [place] and [place])     clotrimazole 10 MG abdi     OTOLARYNGOLOGY REFERRAL   4. Streptococcus exposure Z20.818 Influenza A/B antigen     Rapid strep screen     CBC with platelets     Glucose whole blood     JUST IN CASE     *UA reflex to Microscopic and Culture ([facility] (except [place] and [place])   5. Dermatitis L30.9 triamcinolone (KENALOG) 0.1 % external cream       Suspect viral illness.  Labs reassuring  Mouth KOH negative - may be secondary to viral illness or to maceration of mucosa from teeth grinding   Trial of clotrimazole troches in case koh false negative   Recommend follow up with primary care provider or ENT (with mouth lesions) if no relief in 7-10 days, sooner if worse  Adverse reactions  of medications discussed.  Aware to come back in if with worsening symptoms or if no relief despite treatment plan  Patient voiced understanding and had no further questions.     Dermatitis/eczematous plaques - trial triamcinolone x 2 weeks - if persist needs derm   Come in asap if worsening symptoms  No refills     MD Aby Hernandez MD  [facility]

## 2020-02-17 LAB
BACTERIA SPEC CULT: NORMAL
SPECIMEN SOURCE: NORMAL

## 2020-11-29 ENCOUNTER — HEALTH MAINTENANCE LETTER (OUTPATIENT)
Age: 35
End: 2020-11-29

## 2021-06-22 ENCOUNTER — E-VISIT (OUTPATIENT)
Dept: URGENT CARE | Facility: URGENT CARE | Age: 36
End: 2021-06-22

## 2021-06-22 DIAGNOSIS — R07.89 CHEST DISCOMFORT: Primary | ICD-10-CM

## 2021-06-22 PROCEDURE — 99421 OL DIG E/M SVC 5-10 MIN: CPT | Performed by: PHYSICIAN ASSISTANT

## 2021-06-22 NOTE — PATIENT INSTRUCTIONS
Thank you for choosing us for your care. Based on the information provided, I believe you need to be seen immediately.  Please go to the Urgent Care  as soon as possible.

## 2021-09-19 ENCOUNTER — HEALTH MAINTENANCE LETTER (OUTPATIENT)
Age: 36
End: 2021-09-19

## 2021-10-11 ENCOUNTER — OFFICE VISIT (OUTPATIENT)
Dept: URGENT CARE | Facility: URGENT CARE | Age: 36
End: 2021-10-11
Payer: COMMERCIAL

## 2021-10-11 ENCOUNTER — E-VISIT (OUTPATIENT)
Dept: FAMILY MEDICINE | Facility: CLINIC | Age: 36
End: 2021-10-11

## 2021-10-11 VITALS
WEIGHT: 220 LBS | BODY MASS INDEX: 30.68 KG/M2 | OXYGEN SATURATION: 98 % | TEMPERATURE: 98.1 F | SYSTOLIC BLOOD PRESSURE: 148 MMHG | HEART RATE: 93 BPM | DIASTOLIC BLOOD PRESSURE: 98 MMHG

## 2021-10-11 DIAGNOSIS — Z53.9 ERRONEOUS ENCOUNTER--DISREGARD: Primary | ICD-10-CM

## 2021-10-11 DIAGNOSIS — R07.9 CHEST PAIN, UNSPECIFIED TYPE: ICD-10-CM

## 2021-10-11 DIAGNOSIS — Z72.0 TOBACCO ABUSE: ICD-10-CM

## 2021-10-11 DIAGNOSIS — R03.0 ELEVATED BLOOD PRESSURE READING WITHOUT DIAGNOSIS OF HYPERTENSION: ICD-10-CM

## 2021-10-11 DIAGNOSIS — G47.00 INSOMNIA, UNSPECIFIED TYPE: Primary | ICD-10-CM

## 2021-10-11 PROCEDURE — 99214 OFFICE O/P EST MOD 30 MIN: CPT | Performed by: NURSE PRACTITIONER

## 2021-10-11 RX ORDER — ZOLPIDEM TARTRATE 10 MG/1
TABLET ORAL
COMMUNITY
Start: 2021-09-30

## 2021-10-11 RX ORDER — ALPRAZOLAM 1 MG
TABLET ORAL
COMMUNITY
Start: 2020-02-13

## 2021-10-11 NOTE — PATIENT INSTRUCTIONS
Make appointment with primary care provider    Deep breathing    Search for yoga for bedtime    Journaling    Tobacco cessation recommended        Patient Education     Progressive Relaxation  Your body needs relaxation to reduce stress and calm the fight-or-flight response. It helps to plan for 20 minutes of relaxation every day when you can take time for yourself. Sit or lie comfortably limiting distractions like phones. Put on some soft music or simply sit in silence.  Then incorporate the progressive relaxation technique below.  How to do progressive relaxation  Progressive relaxation helps your whole body relax. To try this technique, follow these steps:  1. Find a quiet room. Sit in a comfortable chair or lie on your back.  2. Breathe in deeply to a slow count of 5. Feel your belly, chest, and back expand. Breathe out slowly to a count of 5. Do this for several minutes.  3. After a few minutes, breathe in deeply again, but this time tighten the muscles in your feet. Notice how it feels. Hold the tension for 3 seconds.  4. Breathe out while relaxing the tightened muscles. Notice how relaxed you feel.  5. Repeat steps 3 and 4 with another muscle group. You can move from your feet, calves, and thighs to your stomach, arms, and hands.  Remember the 4 A s    Avoid a stressor. For example, if someone is smoking when you re trying to quit, leave the room.    Accept a stressor you can t change, like a job loss, by knowing that your feelings are normal.    Alter how you deal with a stressor. For example, if a constantly ringing phone is a stressor, let the answering machine .    Adapt to some stressors. For example, when starting a new exercise program, instead of focusing on how hard it will be, think how good you will feel.  [employer] last reviewed this educational content on 6/1/2018 2000-2021 [employer]. All rights reserved. This information is not intended as a substitute for professional  "medical care. Always follow your healthcare professional's instructions.           Patient Education     Stress Relief: Relaxation  Focusing the mind helps provide stress relief. Taking 5 to 10 minutes to practice relaxation each day helps you feel more refreshed. You can do these exercises almost anywhere. Try one or more until you find what works best for you.  Calm your mind  Find a quiet place where you won't be disturbed. Then try the following:    Sit comfortably. Take off your shoes. Turn off your cell phone. Take a few deep breaths.    Focus your mind on one peaceful thought, image, or word. Then try to hold that thought for 5 minutes.    When other thoughts enter your mind, relax and refocus. Let the invading thoughts fall away.    When you're done, stand up slowly and stretch your arms over your head. With practice, this exercise can help you feel restored.  Calm your body  With practice, you can use mental cues to tell your body how to feel.    Sit comfortably and clear your mind. A few deep breaths will help.    Mentally focus on your left hand and repeat to yourself, \"My left hand feels warm and heavy.\" Keep doing this until your hand does feel heavier and warmer.    Repeat the exercise using your right hand. Then focus on your arms, legs, and feet until your whole body feels relaxed.    When you're done, stand up slowly and stretch your arms overhead.  Visualization  Visualization is like taking a mental vacation. It frees your mind while keeping your body in a calm state. To get started, picture yourself feeling warm and relaxed. Choose a peaceful setting that appeals to you and fill in the details. If you imagine a tropical beach, listen to the waves on the shore. Feel the sun on your face. Dig your toes in the sand. By using the power of your mind, you can take a soothing break when you need to.  Other relaxing activities include yoga and jc chi. You can learn about these and other healthy and " relaxing activities on the National Center for Complementary and Integrative Health (NCCIH) website at www.nccih.nih.gov/health/stress.   [employer] last reviewed this educational content on 12/1/2019 2000-2021 [employer]. All rights reserved. This information is not intended as a substitute for professional medical care. Always follow your healthcare professional's instructions.

## 2021-10-11 NOTE — PROGRESS NOTES
Assessment & Plan     Insomnia, unspecified type    Chest pain, unspecified type    Elevated blood pressure reading without diagnosis of hypertension    Tobacco abuse       With elevated blood pressure and chest pain recommend further evaluation in emergency room now and patient refuses. Discussed can be life threatening especially with his tobacco abuse and he declines. He would like to try Ambien controlled release. Recommend he make an appointment with a primary care provider as this medication would need ongoing monitoring for effectiveness and side effects. Recommended contacting prescriber to discuss further as well. Recommended Deep breathing, Search for yoga for bedtime on youtube, Journaling,Tobacco cessation recommended with sleep hygeine.     Patient is hypertensive today but refuses further workup in emergency room.  No headache, blurring of vision, shortness of breath, dizziness, numbness, or weakness. Second and third BP reading was also above goal.  Patient instructed to follow up with PCP within the next week for BP recheck.  Instructed to go to emergency department if develops worsening chest pain, shortness of breath, dizziness, vision changes, numbness, weakness.       Follow-up with PCP if symptoms persist for 7 days, and sooner if symptoms worsen or new symptoms develop.     Discussed red flag symptoms which warrant immediate visit in emergency room    All questions were answered and patient verbalized understanding. AVS reviewed with patient.     Kimberly Acharya, DNP, APRN, CNP 10/11/2021 2:13 PM  [facility]            Jerica Dutton is a 36 year old male who presents to clinic today for the following health issues:  Chief Complaint   Patient presents with     Urgent Care     Insomnia     c/o insomnia for 4 months     Patient presents for evaluation of insomnia for the past 4 months. He states he takes Ambien 10 mg for the past 3 months which helps him fall asleep  but he only gets 2-3 hours of sleep and then wakes up and is not able to fall alseep. He takes 1 mg alprazolam which he takes a few times a week for anxiety. He has tried melatonin and Nyquil which hasn't seemed to help. He smokes 1/2 ppd. He rarely drinks alcohol 1-2 times per month. No other drug use. He goes to bed around 9pm, falls asleep around 1-2am, wakes up at 5am. He wonders if there is an extended release ambien he could try, as he can't see his prescriber of medications for the next week. No increased stress. He states he always has chest pain.    Problem list, Medication list, Allergies, and Medical history reviewed in EPIC.    ROS:  Review of systems negative except for noted above        Objective    BP (!) 148/98   Pulse 93   Temp 98.1  F (36.7  C) (Tympanic)   Wt 99.8 kg (220 lb)   SpO2 98%   BMI 30.68 kg/m    Physical Exam  Constitutional:       General: He is not in acute distress.     Appearance: He is not toxic-appearing or diaphoretic.   Cardiovascular:      Rate and Rhythm: Normal rate and regular rhythm.      Pulses: Normal pulses.      Heart sounds: Normal heart sounds.   Pulmonary:      Effort: Pulmonary effort is normal. No respiratory distress.      Breath sounds: Normal breath sounds. No wheezing, rhonchi or rales.   Neurological:      Mental Status: He is alert.   Psychiatric:         Mood and Affect: Mood normal.         Behavior: Behavior normal.         Thought Content: Thought content normal.         Judgment: Judgment normal.

## 2022-01-09 ENCOUNTER — HEALTH MAINTENANCE LETTER (OUTPATIENT)
Age: 37
End: 2022-01-09

## 2022-02-27 ENCOUNTER — ANCILLARY PROCEDURE (OUTPATIENT)
Dept: GENERAL RADIOLOGY | Facility: CLINIC | Age: 37
End: 2022-02-27
Attending: FAMILY MEDICINE
Payer: COMMERCIAL

## 2022-02-27 ENCOUNTER — OFFICE VISIT (OUTPATIENT)
Dept: URGENT CARE | Facility: URGENT CARE | Age: 37
End: 2022-02-27
Payer: COMMERCIAL

## 2022-02-27 VITALS
SYSTOLIC BLOOD PRESSURE: 137 MMHG | DIASTOLIC BLOOD PRESSURE: 81 MMHG | OXYGEN SATURATION: 98 % | TEMPERATURE: 97.7 F | HEART RATE: 85 BPM

## 2022-02-27 DIAGNOSIS — M25.511 ACUTE PAIN OF RIGHT SHOULDER: Primary | ICD-10-CM

## 2022-02-27 DIAGNOSIS — M25.511 ACUTE PAIN OF RIGHT SHOULDER: ICD-10-CM

## 2022-02-27 PROCEDURE — 99214 OFFICE O/P EST MOD 30 MIN: CPT | Performed by: FAMILY MEDICINE

## 2022-02-27 PROCEDURE — 73030 X-RAY EXAM OF SHOULDER: CPT | Mod: RT | Performed by: RADIOLOGY

## 2022-02-27 RX ORDER — PREGABALIN 100 MG/1
100 CAPSULE ORAL
COMMUNITY
Start: 2022-02-16

## 2022-02-27 NOTE — PROGRESS NOTES
Chief complaint: arm pain    Patient slipped and fell and hyperextended the right arm and has been hurting since then   Happened 3 weeks ago   Occasionally numbness in hand with certain positions    Patient is right handed     Feeling worse  Today    Patient does edmar for a living   Pain is worse at the end of the day    No Known Allergies    Past Medical History:   Diagnosis Date     History of kidney stones        ALPRAZolam (XANAX) 1 MG tablet,   pregabalin (LYRICA) 100 MG capsule, Take 100 mg by mouth  sertraline (ZOLOFT) 50 MG tablet, Take 1 tablet by mouth  zolpidem (AMBIEN) 10 MG tablet,     No current facility-administered medications on file prior to visit.      Social History     Tobacco Use     Smoking status: Current Every Day Smoker     Packs/day: 0.75     Types: Cigarettes     Smokeless tobacco: Former User   Substance Use Topics     Alcohol use: No     Drug use: No       ROS:  review of systems negative except for noted above.       OBJECTIVE:  /81   Pulse 85   Temp 97.7  F (36.5  C) (Tympanic)   SpO2 98%    General:   awake, alert, and cooperative.  NAD.   Head: Normocephalic, atraumatic.  Eyes: Conjunctiva clear  Neuro: Alert and oriented - normal speech.  MS: Using extremities freely  Affected extremity neurovascularly intact, no cyanosis or edema, good pulses and capillary refill.   No erythema warmth or swelling  AC joint tenderness: none  Bicipital groove tenderness: mild right shoulder  Speed's test: positive   Cross arm test: negative   Active range of motion: intact  Passive range of motion: intact patient reporting pain abduction more than 120 degrees and forward flexion more than 90 degrees  Motor intact sensory intact.     PSYCH:  Normal affect, normal speech  SKIN: no obvious rashes    ASSESSMENT:    ICD-10-CM    1. Acute pain of right shoulder  M25.511 XR Shoulder Right G/E 3 Views     Orthopedic  Referral     diclofenac (VOLTAREN) 1 % topical gel       PLAN:    xrays negative for any acute fracture or dislocation  Trial voltaren gel  Referred to orthopedics for follow up     Advised about symptoms which might herald more serious problems.        Aby Diaz MD

## 2022-02-28 ENCOUNTER — OFFICE VISIT (OUTPATIENT)
Dept: ORTHOPEDICS | Facility: CLINIC | Age: 37
End: 2022-02-28
Payer: COMMERCIAL

## 2022-02-28 ENCOUNTER — HOSPITAL ENCOUNTER (OUTPATIENT)
Dept: MRI IMAGING | Facility: CLINIC | Age: 37
Discharge: HOME | End: 2022-02-28
Attending: PEDIATRICS | Admitting: PEDIATRICS
Payer: COMMERCIAL

## 2022-02-28 VITALS
DIASTOLIC BLOOD PRESSURE: 99 MMHG | WEIGHT: 220 LBS | SYSTOLIC BLOOD PRESSURE: 149 MMHG | HEIGHT: 71 IN | BODY MASS INDEX: 30.8 KG/M2

## 2022-02-28 DIAGNOSIS — M25.511 ACUTE PAIN OF RIGHT SHOULDER: ICD-10-CM

## 2022-02-28 PROCEDURE — 73221 MRI JOINT UPR EXTREM W/O DYE: CPT | Mod: RT

## 2022-02-28 PROCEDURE — 73221 MRI JOINT UPR EXTREM W/O DYE: CPT | Mod: 26 | Performed by: RADIOLOGY

## 2022-02-28 PROCEDURE — 99204 OFFICE O/P NEW MOD 45 MIN: CPT | Performed by: PEDIATRICS

## 2022-02-28 RX ORDER — METHOCARBAMOL 750 MG/1
750 TABLET, FILM COATED ORAL 3 TIMES DAILY PRN
Qty: 30 TABLET | Refills: 0 | Status: SHIPPED | OUTPATIENT
Start: 2022-02-28

## 2022-02-28 RX ORDER — NAPROXEN 500 MG/1
500 TABLET ORAL 2 TIMES DAILY PRN
Qty: 30 TABLET | Refills: 0 | Status: SHIPPED | OUTPATIENT
Start: 2022-02-28

## 2022-02-28 NOTE — PROGRESS NOTES
"ASSESSMENT & PLAN    Neftali was seen today for pain.    Diagnoses and all orders for this visit:    Acute pain of right shoulder  -     Orthopedic  Referral  -     MR Shoulder Right w/o Contrast; Future  -     naproxen (NAPROSYN) 500 MG tablet; Take 1 tablet (500 mg) by mouth 2 times daily as needed for moderate pain  -     methocarbamol (ROBAXIN) 750 MG tablet; Take 1 tablet (750 mg) by mouth 3 times daily as needed for muscle spasms      This issue is acute and Unchanged.    Nato to follow up with Primary Care provider regarding elevated blood pressure.    We discussed these other possible diagnosis: concern for RC tear, will obtain MRI    Plan:  - Today's Plan of Care:  MRI of the right shoulder - Call [phone] to schedule MRI  Discussed activity considerations and other supportive care including Ice/Heat, OTC and other topical medications as needed.    Naproxen prescribed - do not take with OTC ibuprofen or naproxen  Methocarbamol prescribed - do not take with Ambien or Cyclobenzaprine  (I reviewed the mechanism of action as well as risk/benefit profile of medications. Questions answered.)    -We also discussed other future treatment options:  Referral to Physical Therapy  Consideration of injections  Referral to Orthopedic Surgery    Follow Up: In clinic with Dr. Slois after MRI (wait at least 1-2 days)    Concerning signs and symptoms were reviewed.  The patient expressed understanding of this management plan and all questions were answered at this time.    Linette Solsi MD [facility]  Sports Medicine Physician  [facility]      -----  Chief Complaint   Patient presents with     Right Shoulder - Pain       SUBJECTIVE  Neftali Chan is a/an 36 year old male who is seen as an  referral for evaluation of right shoulder pain.     The patient is seen by themselves.    Onset: 2 week(s) ago. Patient describes injury as unsure of ISMAEL, but patient is a \"\" for a " "living  Location of Pain: right shoulder, middle distal deltoid, RTC  Worsened by: not using the shoulder, sleeping, shoulder abduction, flexion, IR/ER  Better with: using it helps with the pain, but too much use aggravates.  Treatments tried: rest/activity avoidance, ice, heat, Tylenol, Aleve and previous imaging (xray 2/27/22)  Associated symptoms: numbness, occasional tingling in his finger tips (2 weeks), weakness of right arm/shoulder, locking or catching and feeling of instability    Orthopedic/Surgical history: NO  Social History/Occupation: Laying edmar    No family history pertinent to patient's problem today.    REVIEW OF SYSTEMS:  Review of Systems  Skin: no bruising, no swelling  Musculoskeletal: as above  Neurologic: no numbness, paresthesias  Remainder of review of systems is negative including constitutional, CV, pulmonary, GI, except as noted in HPI or medical history.    OBJECTIVE:  BP (!) 149/99   Ht 1.803 m (5' 11\")   Wt 99.8 kg (220 lb)   BMI 30.68 kg/m     General: healthy, alert and in no distress  HEENT: no scleral icterus or conjunctival erythema  Skin: no suspicious lesions or rash. No jaundice.  CV: distal perfusion intact  Resp: normal respiratory effort without conversational dyspnea   Psych: normal mood and affect  Gait: normal steady gait with appropriate coordination and balance  Neuro: Normal light sensory exam of upper extremity    Bilateral Shoulder exam    Inspection and Posture:       normal    Skin:        no visible deformities    Tender:        subacromial space right    Non Tender:       remainder of shoulder bilateral    ROM:        forward flexion 90  right       abduction 90 right       internal rotation gluteal region right       external rotation 35 right       asymmetric scapular motion    Painful motions:       flexion right       elevation right    Strength:        abduction 3/5 right       flexion 3/5 right       internal rotation 3/5 right       external " rotation 3/5 right    Impingement testing:       positive (+) Neer right       positive (+) Huizar right    Sensation:        normal sensation over shoulder and upper extremity     RADIOLOGY:  I independently visualized and reviewed these images with the patient  3 XR views of right shoulder reviewed: no acute bony abnormality, no significant degenerative change  - will follow official read    Results for orders placed or performed in visit on 02/27/22   XR Shoulder Right G/E 3 Views    Narrative    EXAM: XR SHOULDER RIGHT G/E 3 VIEWS  LOCATION: [facility]  DATE/TIME: 2/27/2022 9:55 AM    INDICATION:  Acute pain of right shoulder  COMPARISON: None.      Impression    IMPRESSION: Normal joint spaces and alignment. No fracture.          Review of the result(s) of each unique test - XR

## 2022-02-28 NOTE — LETTER
2/28/2022         RE: Neftali Chan  [address] [facility] [address]        Dear Colleague,    Thank you for referring your patient, Neftali Chan, to the [facility]. Please see a copy of my visit note below.    ASSESSMENT & PLAN    Neftali was seen today for pain.    Diagnoses and all orders for this visit:    Acute pain of right shoulder  -     Orthopedic  Referral  -     MR Shoulder Right w/o Contrast; Future  -     naproxen (NAPROSYN) 500 MG tablet; Take 1 tablet (500 mg) by mouth 2 times daily as needed for moderate pain  -     methocarbamol (ROBAXIN) 750 MG tablet; Take 1 tablet (750 mg) by mouth 3 times daily as needed for muscle spasms      This issue is acute and Unchanged.    Nato to follow up with Primary Care provider regarding elevated blood pressure.    We discussed these other possible diagnosis: concern for RC tear, will obtain MRI    Plan:  - Today's Plan of Care:  MRI of the right shoulder - Call [phone] to schedule MRI  Discussed activity considerations and other supportive care including Ice/Heat, OTC and other topical medications as needed.    Naproxen prescribed - do not take with OTC ibuprofen or naproxen  Methocarbamol prescribed - do not take with Ambien or Cyclobenzaprine  (I reviewed the mechanism of action as well as risk/benefit profile of medications. Questions answered.)    -We also discussed other future treatment options:  Referral to Physical Therapy  Consideration of injections  Referral to Orthopedic Surgery    Follow Up: In clinic with Dr. Solis after MRI (wait at least 1-2 days)    Concerning signs and symptoms were reviewed.  The patient expressed understanding of this management plan and all questions were answered at this time.    Linette Solis MD [facility]  Sports Medicine Physician  [facility]      -----  Chief Complaint   Patient presents with     Right Shoulder - Pain  "      SUBJECTIVE  Neftali Chan is a/an 36 year old male who is seen as an UC referral for evaluation of right shoulder pain.     The patient is seen by themselves.    Onset: 2 week(s) ago. Patient describes injury as unsure of ISMAEL, but patient is a \"\" for a living  Location of Pain: right shoulder, middle distal deltoid, RTC  Worsened by: not using the shoulder, sleeping, shoulder abduction, flexion, IR/ER  Better with: using it helps with the pain, but too much use aggravates.  Treatments tried: rest/activity avoidance, ice, heat, Tylenol, Aleve and previous imaging (xray 2/27/22)  Associated symptoms: numbness, occasional tingling in his finger tips (2 weeks), weakness of right arm/shoulder, locking or catching and feeling of instability    Orthopedic/Surgical history: NO  Social History/Occupation: Laying [employer]    No family history pertinent to patient's problem today.    REVIEW OF SYSTEMS:  Review of Systems  Skin: no bruising, no swelling  Musculoskeletal: as above  Neurologic: no numbness, paresthesias  Remainder of review of systems is negative including constitutional, CV, pulmonary, GI, except as noted in HPI or medical history.    OBJECTIVE:  BP (!) 149/99   Ht 1.803 m (5' 11\")   Wt 99.8 kg (220 lb)   BMI 30.68 kg/m     General: healthy, alert and in no distress  HEENT: no scleral icterus or conjunctival erythema  Skin: no suspicious lesions or rash. No jaundice.  CV: distal perfusion intact  Resp: normal respiratory effort without conversational dyspnea   Psych: normal mood and affect  Gait: normal steady gait with appropriate coordination and balance  Neuro: Normal light sensory exam of upper extremity    Bilateral Shoulder exam    Inspection and Posture:       normal    Skin:        no visible deformities    Tender:        subacromial space right    Non Tender:       remainder of shoulder bilateral    ROM:        forward flexion 90  right       abduction 90 right       internal " rotation gluteal region right       external rotation 35 right       asymmetric scapular motion    Painful motions:       flexion right       elevation right    Strength:        abduction 3/5 right       flexion 3/5 right       internal rotation 3/5 right       external rotation 3/5 right    Impingement testing:       positive (+) Neer right       positive (+) Huizar right    Sensation:        normal sensation over shoulder and upper extremity     RADIOLOGY:  I independently visualized and reviewed these images with the patient  3 XR views of right shoulder reviewed: no acute bony abnormality, no significant degenerative change  - will follow official read    Results for orders placed or performed in visit on 02/27/22   XR Shoulder Right G/E 3 Views    Narrative    EXAM: XR SHOULDER RIGHT G/E 3 VIEWS  LOCATION: [facility]  DATE/TIME: 2/27/2022 9:55 AM    INDICATION:  Acute pain of right shoulder  COMPARISON: None.      Impression    IMPRESSION: Normal joint spaces and alignment. No fracture.          Review of the result(s) of each unique test - XR             Again, thank you for allowing me to participate in the care of your patient.        Sincerely,        Linette Solis MD

## 2022-02-28 NOTE — PATIENT INSTRUCTIONS
Nato to follow up with Primary Care provider regarding elevated blood pressure.    We discussed these other possible diagnosis: concern for RC tear, will obtain MRI    Plan:  - Today's Plan of Care:  MRI of the right shoulder - Call [phone] to schedule MRI  Discussed activity considerations and other supportive care including Ice/Heat, OTC and other topical medications as needed.    Naproxen prescribed - do not take with OTC ibuprofen or naproxen  Methocarbamol prescribed - do not take with Ambien or cyclobenzaprine  (I reviewed the mechanism of action as well as risk/benefit profile of medications. Questions answered.)    -We also discussed other future treatment options:  Referral to Physical Therapy  Consideration of injections  Referral to Orthopedic Surgery    Follow Up: In clinic with Dr. Solis after MRI (wait at least 1-2 days)    If you have any further questions for your physician or physician s care team you can call [phone] and use option 3 to leave a voice message. Calls received during business hours will be returned same day.

## 2022-03-01 ENCOUNTER — TELEPHONE (OUTPATIENT)
Dept: ORTHOPEDICS | Facility: CLINIC | Age: 37
End: 2022-03-01
Payer: COMMERCIAL

## 2022-03-01 LAB — RADIOLOGIST FLAGS: NORMAL

## 2022-03-01 NOTE — TELEPHONE ENCOUNTER
Results for orders placed or performed during the hospital encounter of 02/28/22   MR Shoulder Right w/o Contrast     Value    Radiologist flags Supraclavicular adenopathy.    Narrative    EXAM: MR right shoulder without  contrast 3/1/2022 9:04 AM    TECHNIQUE: Multiplanar, multisequence imaging of the right shoulder  were obtained without administration of intravenous or intra-articular  gadolinium contrast using routine protocol.    History: eval RC tear; Acute pain of right shoulder    Comparison: X-ray 2/27/2022    Findings:    ROTATOR CUFF and ASSOCIATED STRUCTURES  Rotator cuff: Supraspinatus tendinosis. On a background tendinosis,  tiny focal low-grade intrasubstance tear of the infraspinatus middle  fibers at the footprint. Teres minor tendon is intact.    On a background of subscapularis tendinosis, a short segment  delamination along subscapularis myotendinous junction caudal fibers.     Bursa: Small amount of subacromial/subdeltoid bursal fluid with  synovial proliferation, consistent with bursitis.    Musculature: Muscle bulk of rotator cuff is preserved.  Deltoid muscle  bulk is also preserved.  Epimysial edema of the supraspinatus and  myotendinous junction edema of the subscapularis are consistent with  muscle strain. Focal edema of the lateral part of the deltoid, may be  related to recent vaccination.    Acromioclavicular joint  There are mild degenerative changes of the acromioclavicular joint.  Acromion is type 1 in sagittal morphology. Coracoacromial ligament is  not thickened.    OSSEOUS STRUCTURES  No fracture, marrow contusion or marrow infiltration.    LONG BICIPITAL TENDON  The long head of the biceps tendon is normally situated within the  bicipital groove. No complete or partial biceps tendon tear is  present.    GLENOHUMERAL JOINT  Joint fluid: Physiologic amount of joint fluid is present.    Cartilage and subarticular bone:  No focal hyaline cartilage defects  are noted. No Hill-Sachs,  reverse Hill-Sachs, or bony Bankart lesions  are seen.    Labrum: Limited assessment on this study with relative lack of joint  distention shows no labral tear. Presumed sublabral foramen  anterosuperiorly.    ANCILLARY FINDINGS:  Prominent 7 mm short axis supraclavicular lymph node (image 3 series  4).      Impression    Impression:  1. On a background tendinosis, tiny focal low-grade intrasubstance  tear of the infraspinatus middle fibers at the footprint. No muscle  bulk loss.   2. On a background of subscapularis tendinosis, a short segment  delamination tear along subscapularis myotendinous junction caudal  fibers. No muscle bulk loss.  3. Mild subacromial/subdeltoid bursitis.  4. Low grade muscle strain of supraspinatus and subscapularis.   5. Focal edema of the lateral part of the deltoid and prominent  supraclavicular lymph node, may be related to recent vaccination.  Please correlate clinically for recent COVID vaccination history.  Consider follow up (possibly with ultrasound) for resolution of the  supraclavicular lymph node in a 4-12 weeks.    [Consider Follow Up: Supraclavicular adenopathy.]    This report will be copied to the [facility] to ensure a  provider acknowledges the finding. [facility] is available Monday  through Friday 8am-3:30 pm.    I have personally reviewed the examination and initial interpretation  and I agree with the findings.    RALPH TOTH         SYSTEM ID:  [id number]

## 2022-03-01 NOTE — TELEPHONE ENCOUNTER
LVM for radiology line.   Let them know we have received the message, and have passed along the results to the provider.     Please see results below    Krista Harmon MS ATC

## 2022-03-02 NOTE — RESULT ENCOUNTER NOTE
Reviewed and notified of results via [employer].Hi Nato,  Please see the results of your MRI.  We will review this in more detail at your follow-up appointment tomorrow.  If you did not have a recent vaccination in that arm, we can discuss further work up at your appointment.  Let us know if you have questions.    Linette Solis MD, CAQ  Primary Care Sports Medicine  [facility]

## 2022-03-02 NOTE — TELEPHONE ENCOUNTER
Spoke with Radiologist over the phone.  Send [employer] message to patient.  He is schedule for follow up tomorrow 3/3/2022 to discuss.    Linette Solis MD

## 2022-03-03 ENCOUNTER — OFFICE VISIT (OUTPATIENT)
Dept: ORTHOPEDICS | Facility: CLINIC | Age: 37
End: 2022-03-03
Payer: COMMERCIAL

## 2022-03-03 VITALS
DIASTOLIC BLOOD PRESSURE: 91 MMHG | SYSTOLIC BLOOD PRESSURE: 132 MMHG | BODY MASS INDEX: 30.8 KG/M2 | HEIGHT: 71 IN | WEIGHT: 220 LBS

## 2022-03-03 DIAGNOSIS — M25.511 ACUTE PAIN OF RIGHT SHOULDER: Primary | ICD-10-CM

## 2022-03-03 DIAGNOSIS — R59.0 DISCRETE LYMPH NODE: ICD-10-CM

## 2022-03-03 DIAGNOSIS — M67.819 TENDINOSIS OF ROTATOR CUFF: ICD-10-CM

## 2022-03-03 DIAGNOSIS — M75.51 ACUTE BURSITIS OF RIGHT SHOULDER: ICD-10-CM

## 2022-03-03 PROCEDURE — 20610 DRAIN/INJ JOINT/BURSA W/O US: CPT | Mod: RT | Performed by: PEDIATRICS

## 2022-03-03 PROCEDURE — 99213 OFFICE O/P EST LOW 20 MIN: CPT | Mod: 25 | Performed by: PEDIATRICS

## 2022-03-03 RX ORDER — TRIAMCINOLONE ACETONIDE 40 MG/ML
40 INJECTION, SUSPENSION INTRA-ARTICULAR; INTRAMUSCULAR
Status: SHIPPED | OUTPATIENT
Start: 2022-03-03

## 2022-03-03 RX ORDER — LIDOCAINE HYDROCHLORIDE 10 MG/ML
2 INJECTION, SOLUTION INFILTRATION; PERINEURAL
Status: SHIPPED | OUTPATIENT
Start: 2022-03-03

## 2022-03-03 RX ADMIN — TRIAMCINOLONE ACETONIDE 40 MG: 40 INJECTION, SUSPENSION INTRA-ARTICULAR; INTRAMUSCULAR at 12:11

## 2022-03-03 RX ADMIN — LIDOCAINE HYDROCHLORIDE 2 ML: 10 INJECTION, SOLUTION INFILTRATION; PERINEURAL at 12:11

## 2022-03-03 NOTE — LETTER
March 3, 2022      Neftali Ramos [facility]  [address] [place] [id number]        To Whom It May Concern,     Nato is under my care for right shoulder pain.  He should be on light duty over the next 3 weeks.      Sincerely,        Linette Solis MD

## 2022-03-03 NOTE — LETTER
3/3/2022         RE: Neftali Chan  [address] [facility] [address]        Dear Colleague,    Thank you for referring your patient, Neftali Chan, to the [facility] [place]. Please see a copy of my visit note below.    ASSESSMENT & PLAN    Neftali was seen today for pain, follow up and mri transcription.    Diagnoses and all orders for this visit:    Acute pain of right shoulder  -     Physical Therapy Referral; Future    Acute bursitis of right shoulder  -     Physical Therapy Referral; Future    Tendinosis of rotator cuff  -     Physical Therapy Referral; Future    Discrete lymph node  -     Physical Therapy Referral; Future  -     US Upper Extremity Non Vascular Right; Future      This issue is acute and Worsening.    We discussed these other possible diagnosis:  Given RC tendinosis, partial tear, discussed injection for inflammation. Recommend physical therapy as well.  Reviewed the potential diagnosis (partial rotator cuff tear, rotator cuff tendinosis) and discussed the utility of corticosteroid injections (pain relief only).  Stressed the importance of physical therapy to strengthen shoulder and increased liklihood of pain returning if injection is not coupled with therapy.    No recent concerning infectious symptoms. Recommend PCP follow up given supraclavicular lymph node for discussion if further work up is needed.    Plan:  - Today's Plan of Care:  Rehab: Physical Therapy: External  Steroid injection of the right shoulder: subacromial space was performed today in clinic  Icing for the next 1-2 days may be helpful for pain. Injection may take 10-14 days to see the full effect.  Discussed the importance of rest especially while injection is taking affect, very gradual return to activities.    Follow up with PCP given supraclavicular lymph node and no recent vaccination to consider other work up.  US ordered in 1 months.    -We also discussed other future  treatment options:  Referral to Orthopedic Surgery    Follow Up: 6 - 8 weeks    Concerning signs and symptoms were reviewed.  The patient expressed understanding of this management plan and all questions were answered at this time.    Linette Solis MD [facility]  Sports Medicine Physician  [facility]      SUBJECTIVE- Interim History March 3, 2022    Chief Complaint   Patient presents with     Right Shoulder - Pain, Follow Up, MRI Transcription       Neftali Chan is a 36 year old male who is seen in f/u up for    Acute pain of right shoulder  Acute bursitis of right shoulder  Tendinosis of rotator cuff  Discrete lymph node.  Since last visit on 2/28/22 patient has been feeling much worse, mainly pain.  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  He states he has not had a vaccination on that side.  Here to review MRI.  - Now ~ 2.5 weeks from initial injury    - Denies recent fevers or chills, other respiratory or abdominal symptoms. Did have a fever around 1 month ago.    Location of Pain: right shoulder, middle distal deltoid, RTC  Worsened by: not using the shoulder, sleeping, shoulder abduction, flexion, IR/ER  Better with: using it helps with the pain, but too much use aggravates.  Treatments tried: rest/activity avoidance, ice, heat, Tylenol, Aleve and previous imaging (xray 2/27/22)  Associated symptoms: numbness, occasional tingling in his finger tips (2 weeks), weakness of right arm/shoulder, locking or catching and feeling of instability     Orthopedic/Surgical history: NO  Social History/Occupation: [employer]aying [employer]  No family history pertinent to patient's problem today.    REVIEW OF SYSTEMS:  Review of Systems  Skin: no bruising, no swelling  Musculoskeletal: as above  Neurologic: occasional numbness, paresthesias  Remainder of review of systems is negative including constitutional, CV, pulmonary, GI, except as noted in HPI or medical history.    OBJECTIVE:  BP  "(!) 132/91   Ht 1.803 m (5' 11\")   Wt 99.8 kg (220 lb)   BMI 30.68 kg/m       General: healthy, alert and in no distress  HEENT: no scleral icterus or conjunctival erythema  Skin: no suspicious lesions or rash. No jaundice.  CV: distal perfusion intact  Resp: normal respiratory effort without conversational dyspnea   Psych: normal mood and affect  Gait: normal steady gait with appropriate coordination and balance  Neuro: Normal light sensory exam of upper extremity     Bilateral Shoulder exam  Inspection and Posture:       normal     Skin:        no visible deformities     Tender:        subacromial space right     Non Tender:       remainder of shoulder bilateral     ROM:        forward flexion 130  right       abduction 130 right       internal rotation gluteal region right       external rotation 35 right       asymmetric scapular motion     Painful motions:       flexion right       elevation right     Strength:        abduction 4/5 right       flexion 4/5 right       internal rotation 4/5 right       external rotation 4/5 right     Impingement testing:       positive (+) Neer right       positive (+) Huizar right     Sensation:        normal sensation over shoulder and upper extremity     RADIOLOGY:  Final results and radiologist's interpretation, available in the [facility] health record.  Images were reviewed with the patient in the office today.  My personal interpretation of the performed imaging:    MRI with RC tendinosis, bursitis, partial tearing, supraclavicular lymph node visible    Results for orders placed or performed during the hospital encounter of 02/28/22   MR Shoulder Right w/o Contrast     Value    Radiologist flags Supraclavicular adenopathy.    Narrative    EXAM: MR right shoulder without  contrast 3/1/2022 9:04 AM    TECHNIQUE: Multiplanar, multisequence imaging of the right shoulder  were obtained without administration of intravenous or intra-articular  gadolinium contrast using routine " protocol.    History: eval RC tear; Acute pain of right shoulder    Comparison: X-ray 2/27/2022    Findings:    ROTATOR CUFF and ASSOCIATED STRUCTURES  Rotator cuff: Supraspinatus tendinosis. On a background tendinosis,  tiny focal low-grade intrasubstance tear of the infraspinatus middle  fibers at the footprint. Teres minor tendon is intact.    On a background of subscapularis tendinosis, a short segment  delamination along subscapularis myotendinous junction caudal fibers.     Bursa: Small amount of subacromial/subdeltoid bursal fluid with  synovial proliferation, consistent with bursitis.    Musculature: Muscle bulk of rotator cuff is preserved.  Deltoid muscle  bulk is also preserved.  Epimysial edema of the supraspinatus and  myotendinous junction edema of the subscapularis are consistent with  muscle strain. Focal edema of the lateral part of the deltoid, may be  related to recent vaccination.    Acromioclavicular joint  There are mild degenerative changes of the acromioclavicular joint.  Acromion is type 1 in sagittal morphology. Coracoacromial ligament is  not thickened.    OSSEOUS STRUCTURES  No fracture, marrow contusion or marrow infiltration.    LONG BICIPITAL TENDON  The long head of the biceps tendon is normally situated within the  bicipital groove. No complete or partial biceps tendon tear is  present.    GLENOHUMERAL JOINT  Joint fluid: Physiologic amount of joint fluid is present.    Cartilage and subarticular bone:  No focal hyaline cartilage defects  are noted. No Hill-Sachs, reverse Hill-Sachs, or bony Bankart lesions  are seen.    Labrum: Limited assessment on this study with relative lack of joint  distention shows no labral tear. Presumed sublabral foramen  anterosuperiorly.    ANCILLARY FINDINGS:  Prominent 7 mm short axis supraclavicular lymph node (image 3 series  4).      Impression    Impression:  1. On a background tendinosis, tiny focal low-grade intrasubstance  tear of the  infraspinatus middle fibers at the footprint. No muscle  bulk loss.   2. On a background of subscapularis tendinosis, a short segment  delamination tear along subscapularis myotendinous junction caudal  fibers. No muscle bulk loss.  3. Mild subacromial/subdeltoid bursitis.  4. Low grade muscle strain of supraspinatus and subscapularis.   5. Focal edema of the lateral part of the deltoid and prominent  supraclavicular lymph node, may be related to recent vaccination.  Please correlate clinically for recent COVID vaccination history.  Consider follow up (possibly with ultrasound) for resolution of the  supraclavicular lymph node in a 4-12 weeks.    [Consider Follow Up: Supraclavicular adenopathy.]    This report will be copied to the [facility] to ensure a  provider acknowledges the finding. [facility] is available Monday  through Friday 8am-3:30 pm.    I have personally reviewed the examination and initial interpretation  and I agree with the findings.    RALPH NAVNEET         SYSTEM ID:  [id number]       Review of the result(s) of each unique test - MRI   Reviewed MRI with Radiologist.  30 minutes spent on the date of the encounter doing chart review, history and exam, documentation and further activities per the note     Large Joint Injection/Arthocentesis: R subacromial bursa    Date/Time: 3/3/2022 12:11 PM  Performed by: Linette Solis MD  Authorized by: Linette Solis MD     Indications:  Pain  Needle Size:  25 G  Guidance: landmark guided    Approach:  Posterior  Location:  Shoulder      Site:  R subacromial bursa  Medications:  2 mL lidocaine 1 %; 40 mg triamcinolone 40 MG/ML  Outcome:  Tolerated well, no immediate complications  Consent Given by:  Patient  Prep: patient was prepped and draped in usual sterile fashion     The risks, benefits and complications of steroid injection were discussed with the patient (including but not limited to: bleeding, infection, pain, scar, damage to  adjacent structures, atrophy or necrosis of soft tissue, skin blanching, failure to relieve symptoms, worsening of symptoms, allergic reaction). After this discussion all questions were addressed and answered and the patient elected to proceed. The patient tolerated the procedure well without complications.  Also discussed that if diabetic, recommend close monitoring of blood sugars over the next week as cortisone injections can temporarily elevate blood sugars.               Again, thank you for allowing me to participate in the care of your patient.        Sincerely,        Linette Solis MD

## 2022-03-03 NOTE — PATIENT INSTRUCTIONS
We discussed these other possible diagnosis:  Given RC tendinosis, partial tear, discussed injection for inflammation. Recommend physical therapy as well.  Reviewed the potential diagnosis (partial rotator cuff tear, rotator cuff tendinosis) and discussed the utility of corticosteroid injections (pain relief only).  Stressed the importance of physical therapy to strengthen shoulder and increased liklihood of pain returning if injection is not coupled with therapy.    Plan:  - Today's Plan of Care:  Rehab: Physical Therapy: External  Steroid injection of the right shoulder: subacromial space was performed today in clinic  Icing for the next 1-2 days may be helpful for pain. Injection may take 10-14 days to see the full effect.  Discussed the importance of rest especially while injection is taking affect, very gradual return to activities.    Follow up with PCP given supraclavicular lymph node and no recent vaccination to consider other work up.  US ordered in 1 months.    -We also discussed other future treatment options:  Referral to Orthopedic Surgery    Follow Up: 6 - 8 weeks    If you have any further questions for your physician or physician s care team you can call [phone] and use option 3 to leave a voice message. Calls received during business hours will be returned same day.    After the Injection     After the injection, strenuous and repetitive activity should be minimized for approximately 48 hours.   Ice should be applied to the injected area at least for the next 48 hours.   Apply ice to the injected area at least 3 - 4 times a day for 20 minutes each time for the next 48 hours. This can reduce the painful  flare  reaction that can follow an injection the next day. This reaction can cause the area that was injected to hurt more the next day just from the injection. This will resolve within a day if it does occur.     Use over-the-counter pain medications such as Tylenol to help with the pain if  necessary.     After 48 hours, icing the area may be continued if you find it beneficial.     The lidocaine or marcaine (commonly called Novocain) is an anesthetic agent that is injected with the steroid will typically relieve your pain for a few hours following the injection. If the  Novocain  and steroid are injected near a nerve, you may experience local numbness or weakness from the nerve block until it wears off. After this wears off your pain may return until the steroid takes effect.   The steroid may be effective immediately after the injection. Do not be concerned if the injection is not effective in relieving your symptoms immediately. In some cases, it may take up to two weeks for the steroid to work.   If you are diabetic, the corticosteroid may cause your blood sugar to become elevated for several days following the injection. This response usually lasts about 2-4 days before it returns to your normal level.   You should report any adverse reaction to you doctor. Call if there are any questions.

## 2022-03-03 NOTE — PROGRESS NOTES
ASSESSMENT & PLAN    Neftali was seen today for pain, follow up and mri transcription.    Diagnoses and all orders for this visit:    Acute pain of right shoulder  -     Physical Therapy Referral; Future    Acute bursitis of right shoulder  -     Physical Therapy Referral; Future    Tendinosis of rotator cuff  -     Physical Therapy Referral; Future    Discrete lymph node  -     Physical Therapy Referral; Future  -     US Upper Extremity Non Vascular Right; Future      This issue is acute and Worsening.    We discussed these other possible diagnosis:  Given RC tendinosis, partial tear, discussed injection for inflammation. Recommend physical therapy as well.  Reviewed the potential diagnosis (partial rotator cuff tear, rotator cuff tendinosis) and discussed the utility of corticosteroid injections (pain relief only).  Stressed the importance of physical therapy to strengthen shoulder and increased liklihood of pain returning if injection is not coupled with therapy.    No recent concerning infectious symptoms. Recommend PCP follow up given supraclavicular lymph node for discussion if further work up is needed.    Plan:  - Today's Plan of Care:  Rehab: Physical Therapy: External  Steroid injection of the right shoulder: subacromial space was performed today in clinic  Icing for the next 1-2 days may be helpful for pain. Injection may take 10-14 days to see the full effect.  Discussed the importance of rest especially while injection is taking affect, very gradual return to activities.    Follow up with PCP given supraclavicular lymph node and no recent vaccination to consider other work up.  US ordered in 1 months.    -We also discussed other future treatment options:  Referral to Orthopedic Surgery    Follow Up: 6 - 8 weeks    Concerning signs and symptoms were reviewed.  The patient expressed understanding of this management plan and all questions were answered at this time.    Linette Solis MD CA  Sports  "Medicine Physician  [facility]      SUBJECTIVE- Interim History March 3, 2022    Chief Complaint   Patient presents with     Right Shoulder - Pain, Follow Up, MRI Transcription       Neftali Chan is a 36 year old male who is seen in f/u up for    Acute pain of right shoulder  Acute bursitis of right shoulder  Tendinosis of rotator cuff  Discrete lymph node.  Since last visit on 2/28/22 patient has been feeling much worse, mainly pain.  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  He states he has not had a vaccination on that side.  Here to review MRI.  - Now ~ 2.5 weeks from initial injury    - Denies recent fevers or chills, other respiratory or abdominal symptoms. Did have a fever around 1 month ago.    Location of Pain: right shoulder, middle distal deltoid, RTC  Worsened by: not using the shoulder, sleeping, shoulder abduction, flexion, IR/ER  Better with: using it helps with the pain, but too much use aggravates.  Treatments tried: rest/activity avoidance, ice, heat, Tylenol, Aleve and previous imaging (xray 2/27/22)  Associated symptoms: numbness, occasional tingling in his finger tips (2 weeks), weakness of right arm/shoulder, locking or catching and feeling of instability     Orthopedic/Surgical history: NO  Social History/Occupation: Laying [employer]  No family history pertinent to patient's problem today.    REVIEW OF SYSTEMS:  Review of Systems  Skin: no bruising, no swelling  Musculoskeletal: as above  Neurologic: occasional numbness, paresthesias  Remainder of review of systems is negative including constitutional, CV, pulmonary, GI, except as noted in HPI or medical history.    OBJECTIVE:  BP (!) 132/91   Ht 1.803 m (5' 11\")   Wt 99.8 kg (220 lb)   BMI 30.68 kg/m       General: healthy, alert and in no distress  HEENT: no scleral icterus or conjunctival erythema  Skin: no suspicious lesions or rash. No jaundice.  CV: distal perfusion intact  Resp: " normal respiratory effort without conversational dyspnea   Psych: normal mood and affect  Gait: normal steady gait with appropriate coordination and balance  Neuro: Normal light sensory exam of upper extremity     Bilateral Shoulder exam  Inspection and Posture:       normal     Skin:        no visible deformities     Tender:        subacromial space right     Non Tender:       remainder of shoulder bilateral     ROM:        forward flexion 130  right       abduction 130 right       internal rotation gluteal region right       external rotation 35 right       asymmetric scapular motion     Painful motions:       flexion right       elevation right     Strength:        abduction 4/5 right       flexion 4/5 right       internal rotation 4/5 right       external rotation 4/5 right     Impingement testing:       positive (+) Neer right       positive (+) Huizar right     Sensation:        normal sensation over shoulder and upper extremity     RADIOLOGY:  Final results and radiologist's interpretation, available in the [facility] health record.  Images were reviewed with the patient in the office today.  My personal interpretation of the performed imaging:    MRI with RC tendinosis, bursitis, partial tearing, supraclavicular lymph node visible    Results for orders placed or performed during the hospital encounter of 02/28/22   MR Shoulder Right w/o Contrast     Value    Radiologist flags Supraclavicular adenopathy.    Narrative    EXAM: MR right shoulder without  contrast 3/1/2022 9:04 AM    TECHNIQUE: Multiplanar, multisequence imaging of the right shoulder  were obtained without administration of intravenous or intra-articular  gadolinium contrast using routine protocol.    History: eval RC tear; Acute pain of right shoulder    Comparison: X-ray 2/27/2022    Findings:    ROTATOR CUFF and ASSOCIATED STRUCTURES  Rotator cuff: Supraspinatus tendinosis. On a background tendinosis,  tiny focal low-grade intrasubstance tear of  the infraspinatus middle  fibers at the footprint. Teres minor tendon is intact.    On a background of subscapularis tendinosis, a short segment  delamination along subscapularis myotendinous junction caudal fibers.     Bursa: Small amount of subacromial/subdeltoid bursal fluid with  synovial proliferation, consistent with bursitis.    Musculature: Muscle bulk of rotator cuff is preserved.  Deltoid muscle  bulk is also preserved.  Epimysial edema of the supraspinatus and  myotendinous junction edema of the subscapularis are consistent with  muscle strain. Focal edema of the lateral part of the deltoid, may be  related to recent vaccination.    Acromioclavicular joint  There are mild degenerative changes of the acromioclavicular joint.  Acromion is type 1 in sagittal morphology. Coracoacromial ligament is  not thickened.    OSSEOUS STRUCTURES  No fracture, marrow contusion or marrow infiltration.    LONG BICIPITAL TENDON  The long head of the biceps tendon is normally situated within the  bicipital groove. No complete or partial biceps tendon tear is  present.    GLENOHUMERAL JOINT  Joint fluid: Physiologic amount of joint fluid is present.    Cartilage and subarticular bone:  No focal hyaline cartilage defects  are noted. No Hill-Sachs, reverse Hill-Sachs, or bony Bankart lesions  are seen.    Labrum: Limited assessment on this study with relative lack of joint  distention shows no labral tear. Presumed sublabral foramen  anterosuperiorly.    ANCILLARY FINDINGS:  Prominent 7 mm short axis supraclavicular lymph node (image 3 series  4).      Impression    Impression:  1. On a background tendinosis, tiny focal low-grade intrasubstance  tear of the infraspinatus middle fibers at the footprint. No muscle  bulk loss.   2. On a background of subscapularis tendinosis, a short segment  delamination tear along subscapularis myotendinous junction caudal  fibers. No muscle bulk loss.  3. Mild subacromial/subdeltoid  bursitis.  4. Low grade muscle strain of supraspinatus and subscapularis.   5. Focal edema of the lateral part of the deltoid and prominent  supraclavicular lymph node, may be related to recent vaccination.  Please correlate clinically for recent COVID vaccination history.  Consider follow up (possibly with ultrasound) for resolution of the  supraclavicular lymph node in a 4-12 weeks.    [Consider Follow Up: Supraclavicular adenopathy.]    This report will be copied to the [facility] to ensure a  provider acknowledges the finding. Access Center is available Monday  through Friday 8am-3:30 pm.    I have personally reviewed the examination and initial interpretation  and I agree with the findings.    [employer]         SYSTEM ID:  [id number]       Review of the result(s) of each unique test - MRI   Reviewed MRI with Radiologist.  30 minutes spent on the date of the encounter doing chart review, history and exam, documentation and further activities per the note     Large Joint Injection/Arthocentesis: R subacromial bursa    Date/Time: 3/3/2022 12:11 PM  Performed by: Linette Solis MD  Authorized by: Linette Solis MD     Indications:  Pain  Needle Size:  25 G  Guidance: landmark guided    Approach:  Posterior  Location:  Shoulder      Site:  R subacromial bursa  Medications:  2 mL lidocaine 1 %; 40 mg triamcinolone 40 MG/ML  Outcome:  Tolerated well, no immediate complications  Consent Given by:  Patient  Prep: patient was prepped and draped in usual sterile fashion     The risks, benefits and complications of steroid injection were discussed with the patient (including but not limited to: bleeding, infection, pain, scar, damage to adjacent structures, atrophy or necrosis of soft tissue, skin blanching, failure to relieve symptoms, worsening of symptoms, allergic reaction). After this discussion all questions were addressed and answered and the patient elected to proceed. The patient tolerated  the procedure well without complications.  Also discussed that if diabetic, recommend close monitoring of blood sugars over the next week as cortisone injections can temporarily elevate blood sugars.

## 2022-04-27 ENCOUNTER — TELEPHONE (OUTPATIENT)
Dept: ORTHOPEDICS | Facility: CLINIC | Age: 37
End: 2022-04-27
Payer: COMMERCIAL

## 2022-04-27 NOTE — TELEPHONE ENCOUNTER
Please call patient.    At last visit 3/3/2022 I had ordered an US to follow up a supraclavicular lymph node see on MRI.  Please help patient schedule this follow up imaging.    Linette Solis MD

## 2022-04-27 NOTE — TELEPHONE ENCOUNTER
Called JHONY Dutton to return phone call. He is also provided the Imaging services number so he can schedule his follow up US as orderd by Dr. Solis on 3/3/22.    Esthela Lee ATC, CSCS  Dr. Solis's Extender

## 2022-05-05 NOTE — TELEPHONE ENCOUNTER
Please try to call patient again to discussed ordered US for follow up.  If we can't reach patient, please send a letter.    Linette Solis MD

## 2022-05-06 NOTE — TELEPHONE ENCOUNTER
Called Nato, spoke with him regarding obtaining his US.  He is informed that he should still follow up with the US diagnostics to evaluate a supraclavicular lymph node seen on MRI.  He is given the imaging services number and states he will go ahead and schedule.    Esthela Lee ATC, CSCS  Dr. Solis's Extender

## 2022-05-08 ENCOUNTER — NURSE TRIAGE (OUTPATIENT)
Dept: NURSING | Facility: CLINIC | Age: 37
End: 2022-05-08
Payer: COMMERCIAL

## 2022-05-08 NOTE — TELEPHONE ENCOUNTER
"Patient was in the hospital Sunday to Monday for a drug overdose. States he discharged himself, \"I don't do hospitals\". Patient states he has not slept since.    Experiencing insomnia, hot flashes, and mild shortness of breath  No substance use since Sunday and states he is not struggling with substance abuse.    No suicidal ideation or violent behavior.    No chest pain, Hasn't checked for fever    States he is having mild confusion and \"day dreams\".    Per protocol, patient should go to the ED. Patient is refusing and stating \"I'll just deal with it\". RN/Writer emphasized the need for patient to get medical attention and he asked if he could go to the urgent care. Informed patient that he may be referred to the ED, but was able to convince him to at a minimum seek care at the Urgent Care.  Patient verbalized understanding and agreed with plan.    Sharyn Grubbs RN  05/08/22 9:30 AM  [facility] Nurse Advisor      Reason for Disposition    Substance abuse or dependence suspected    Very strange, paranoid or confused behavior    Additional Information    Negative: Difficulty breathing    Negative: Seeing, hearing, or feeling things that are not there (i.e., visual, auditory, or tactile hallucinations)    Negative: Coma (e.g., not moving, not talking, not responding to stimuli)    Negative: Difficult to awaken or acting confused (e.g., disoriented, slurred speech)    Negative: Slow, shallow and weak breathing    Negative: Seizure    Negative: Violent behavior, or threatening to physically hurt or kill someone    Negative: Sounds like a life-threatening emergency to the triager    Negative: Suicide thoughts, threats and attempts, questions or concerns about    Negative: Recent significant injury, see that guideline (e.g., head, neck, chest, abdominal or extremity  injury)    Negative: Other significant medical symptom is present, see that guideline (e.g., chest pain, headache, vomiting)    Negative: Alcohol use, " abuse or dependence: question or problem related to    Negative: Depression is main problem or symptom (e.g., feelings of sadness or hopelessness)    Negative: [1] Fever > 100.0 F (37.8 C) AND [2] IVDA (intravenous drug abuse)    Protocols used: INSOMNIA-A-AH, SUBSTANCE ABUSE AND [id number]    COVID 19 Nurse Triage Plan/Patient Instructions    Please be aware that novel coronavirus (COVID-19) may be circulating in the community. If you develop symptoms such as fever, cough, or SOB or if you have concerns about the presence of another infection including coronavirus (COVID-19), please contact your health care provider or visit [email or web address].     Disposition/Instructions    ED Visit recommended. Follow protocol based instructions.     Bring Your Own Device:  Please also bring your smart device(s) (smart phones, tablets, laptops) and their charging cables for your personal use and to communicate with your care team during your visit.    Thank you for taking steps to prevent the spread of this virus.  o Limit your contact with others.  o Wear a simple mask to cover your cough.  o Wash your hands well and often.    Resources    [facility]: About COVID-19: [email or web address]    CDC: What to Do If You're Sick: www.cdc.gov/coronavirus/2019-ncov/about/steps-when-sick.html    CDC: Ending Home Isolation: www.cdc.gov/coronavirus/2019-ncov/hcp/disposition-in-home-patients.html     CDC: Caring for Someone: www.cdc.gov/coronavirus/2019-ncov/if-you-are-sick/care-for-someone.html     [facility]: Interim Guidance for Hospital Discharge to Home: www.health.[facility].[place]/diseases/coronavirus/hcp/hospdischarge.pdf    [facility] clinical trials (COVID-19 research studies): [email or web address]/umn-[email or web address]-trials     Below are the COVID-19 hotlines at the [place] Department of Health ([facility]). Interpreters are available.   o For health questions: Call 734-649-3371 or 1-780.176.8911 (7 a.m.  to 7 p.m.)  o For questions about schools and childcare: Call 384-746-9124 or 1-404.392.3182 (7 a.m. to 7 p.m.)

## 2022-07-08 ENCOUNTER — OFFICE VISIT (OUTPATIENT)
Dept: URGENT CARE | Facility: URGENT CARE | Age: 37
End: 2022-07-08

## 2022-07-08 VITALS
DIASTOLIC BLOOD PRESSURE: 105 MMHG | HEART RATE: 69 BPM | OXYGEN SATURATION: 99 % | SYSTOLIC BLOOD PRESSURE: 177 MMHG | TEMPERATURE: 98.1 F | WEIGHT: 227 LBS | BODY MASS INDEX: 31.66 KG/M2

## 2022-07-08 DIAGNOSIS — Z87.442 HISTORY OF NEPHROLITHIASIS: ICD-10-CM

## 2022-07-08 DIAGNOSIS — R10.9 ACUTE RIGHT FLANK PAIN: Primary | ICD-10-CM

## 2022-07-08 PROCEDURE — 99213 OFFICE O/P EST LOW 20 MIN: CPT | Performed by: PHYSICIAN ASSISTANT

## 2022-07-08 RX ORDER — TAMSULOSIN HYDROCHLORIDE 0.4 MG/1
0.4 CAPSULE ORAL DAILY
Qty: 15 CAPSULE | Refills: 0 | Status: SHIPPED | OUTPATIENT
Start: 2022-07-08 | End: 2022-07-23

## 2022-07-08 RX ORDER — HYDROCODONE BITARTRATE AND ACETAMINOPHEN 5; 325 MG/1; MG/1
1 TABLET ORAL EVERY 6 HOURS PRN
Qty: 12 TABLET | Refills: 0 | Status: SHIPPED | OUTPATIENT
Start: 2022-07-08 | End: 2022-07-11

## 2022-07-08 ASSESSMENT — PAIN SCALES - GENERAL: PAINLEVEL: EXTREME PAIN (9)

## 2022-07-08 NOTE — PROGRESS NOTES
Assessment & Plan     1. Acute right flank pain    - tamsulosin (FLOMAX) 0.4 MG capsule; Take 1 capsule (0.4 mg) by mouth daily for 15 days  Dispense: 15 capsule; Refill: 0  - HYDROcodone-acetaminophen (NORCO) 5-325 MG tablet; Take 1 tablet by mouth every 6 hours as needed for pain  Dispense: 12 tablet; Refill: 0    2. History of nephrolithiasis      R/O hydronephrosis with stone obstruction. He was encouraged to be evaluated in the ED and stated he will if he cannot urinate over the next 12 hours.                 Richard Levi PA-C  [facility] [place]    Jerica Dutton is a 37 year old male who presents to clinic today for the following health issues:  Chief Complaint   Patient presents with     Back Pain     Since this morning      HPI    Location of pain to right side, right back and pelvic area. Passed a kidney stone urinating today 0630. Some bleeding afterward. Intensity of pain is 9/10 now. Has not urinated for 8-12 hrs. Took alleve and vicodin leftover from last kidney stone passing over one year. No fever.               Review of Systems        Objective    BP (!) 177/105 (BP Location: Right arm, Patient Position: Chair, Cuff Size: Adult Regular)   Pulse 69   Temp 98.1  F (36.7  C) (Oral)   Wt 103 kg (227 lb)   SpO2 99%   BMI 31.66 kg/m    Physical Exam  Abdominal:      General: Abdomen is protuberant. Bowel sounds are normal.      Palpations: Abdomen is rigid.      Tenderness: There is abdominal tenderness.

## 2022-11-21 ENCOUNTER — HEALTH MAINTENANCE LETTER (OUTPATIENT)
Age: 37
End: 2022-11-21

## 2023-04-16 ENCOUNTER — HEALTH MAINTENANCE LETTER (OUTPATIENT)
Age: 38
End: 2023-04-16

## 2024-06-23 ENCOUNTER — HEALTH MAINTENANCE LETTER (OUTPATIENT)
Age: 39
End: 2024-06-23

## 2024-11-14 ENCOUNTER — MYC REFILL (OUTPATIENT)
Dept: ORTHOPEDICS | Facility: CLINIC | Age: 39
End: 2024-11-14
Payer: COMMERCIAL

## 2024-11-14 DIAGNOSIS — M25.511 ACUTE PAIN OF RIGHT SHOULDER: ICD-10-CM

## 2024-11-15 RX ORDER — METHOCARBAMOL 750 MG/1
750 TABLET, FILM COATED ORAL 3 TIMES DAILY PRN
Qty: 30 TABLET | Refills: 0 | OUTPATIENT
Start: 2024-11-15

## 2025-07-12 ENCOUNTER — HEALTH MAINTENANCE LETTER (OUTPATIENT)
Age: 40
End: 2025-07-12